# Patient Record
Sex: MALE | Race: WHITE | NOT HISPANIC OR LATINO | Employment: OTHER | URBAN - METROPOLITAN AREA
[De-identification: names, ages, dates, MRNs, and addresses within clinical notes are randomized per-mention and may not be internally consistent; named-entity substitution may affect disease eponyms.]

---

## 2017-01-10 ENCOUNTER — INITIAL CONSULT (OUTPATIENT)
Dept: OPHTHALMOLOGY | Facility: CLINIC | Age: 67
End: 2017-01-10
Payer: MEDICARE

## 2017-01-10 DIAGNOSIS — H53.021 REFRACTIVE AMBLYOPIA, RIGHT: ICD-10-CM

## 2017-01-10 DIAGNOSIS — H25.13 NUCLEAR SCLEROSIS, BILATERAL: Primary | ICD-10-CM

## 2017-01-10 DIAGNOSIS — H52.7 REFRACTIVE ERROR: ICD-10-CM

## 2017-01-10 DIAGNOSIS — E11.9 DIABETES MELLITUS TYPE 2 WITHOUT RETINOPATHY: ICD-10-CM

## 2017-01-10 PROCEDURE — 99213 OFFICE O/P EST LOW 20 MIN: CPT | Mod: PBBFAC,PO | Performed by: OPHTHALMOLOGY

## 2017-01-10 PROCEDURE — 99999 PR PBB SHADOW E&M-EST. PATIENT-LVL III: CPT | Mod: PBBFAC,,, | Performed by: OPHTHALMOLOGY

## 2017-01-10 PROCEDURE — 92014 COMPRE OPH EXAM EST PT 1/>: CPT | Mod: S$PBB,,, | Performed by: OPHTHALMOLOGY

## 2017-01-10 NOTE — LETTER
January 10, 2017      JOSEPH Lange, OD  [address] [facility] [address] [place] [address]           [place] [facility]  [address] [facility] [address] [place] [address]  Phone: [phone]  Fax: [phone]          Patient: Cyurs Eckert   MR Number: [id number]   YOB: 1950   Date of Visit: 1/10/2017       Dear Dr. JOSEPH Lange:    Thank you for referring Cyrus Eckert to me for evaluation. Attached you will find relevant portions of my assessment and plan of care.    If you have questions, please do not hesitate to call me. I look forward to following Cyrus Eckert along with you.    Sincerely,    Guera Lui MD    Enclosure  CC:  No Recipients    If you would like to receive this communication electronically, please contact [email or web address] or [phone] to request more information on [employer] Link access.    For providers and/or their staff who would like to refer a patient to [facility], please contact us through our one-stop-shop provider referral line, [facility], at [phone].    If you feel you have received this communication in error or would no longer like to receive these types of communications, please e-mail [email or web address]

## 2017-01-10 NOTE — PATIENT INSTRUCTIONS
What Are Cataracts?  A clear lens in the eye focuses light. This lets the eye see images sharply. With age, the lens slowly becomes cloudy. The cloudy lens is a cataract. A cataract scatters light and makes it hard for the eye to focus. Cataracts often form in both eyes. But one lens may cloud faster than the other.      The aging of your lens  Your lens may cloud so slowly that you don`t notice any vision changes at first. But as the cataract gets worse, the eye has a harder time focusing. In early stages, glasses may help you see better. As the lens gets more cloudy, your doctor may recommend surgery to restore your vision.  © 5802-9383 [employer]. [address] [place] [address]. All rights reserved. This information is not intended as a substitute for professional medical care. Always follow your healthcare professional's instructions.        Small-Incision Cataract Surgery: Removing the Old Lens  You may be surprised by how little time small-incision cataract surgery takes.  The procedure  Your doctor uses a microscope and tiny tools to make the incision and remove the old lens. A special tool breaks apart the old lens with sound waves (ultrasound) and then takes out the pieces. This process is called phacoemulsification. The natural membrane (capsule) that held your lens is left in place.  Incision size  A smaller incision (top) means a shorter recovery time for you. The location of the incision will vary.         © 6494-5492 [employer]. [address] [place] [address]. All rights reserved. This information is not intended as a substitute for professional medical care. Always follow your healthcare professional's instructions.        Small-Incision Cataract Surgery: Implanting the New Lens  Once your old lens has been removed, your doctor slips the new lens (IOL or intraocular lens) in through the incision. The IOL is then placed in the capsule that held your  old lens. With the new lens in place, your doctor is ready to close the incision. Some incisions may be closed with stitches. Others are self-sealing. That means they will stay closed on their own without stitches. The IOL does much the same thing as your old lens did before it became cloudy. It focuses light, letting you see sharp images and vivid colors. The IOL normally lasts a lifetime.  How small is an IOL?        Flexible tabs hold the IOL in place in the eye's natural capsule.     © 3626-5411 [employer]. [address] [place] [address]. All rights reserved. This information is not intended as a substitute for professional medical care. Always follow your healthcare professional's instructions.        Before Small-Incision Cataract Surgery  Like any operation, small-incision cataract surgery requires preparation.    Your health history  Your eye doctor will review your health history. Based on that, he or she may order tests or talk to your other health care providers. Tell your eye doctor which medicines you take. That includes over-the-counter medicine such as aspirin.  Your eye exam  You will have a complete eye exam. Your eye doctor or a technician will use devices that measure the length and curve of your eye. These measurements let your doctor select the proper new lens (IOL or intraocular lens) for you.  The night before surgery  Dont eat or drink anything after midnight the night before your surgery. This includes water, coffee, chewing gum, and mints. If you have been told to continue your daily medicine, take it only with small sips of water. Make sure you follow any other instructions your doctor gives you.  The day of surgery  Have someone you know drive you to and from the outpatient surgery clinic or hospital. Plan to be there for about 2 to 3 hours. When you arrive, youll sign a consent form if you havent done so already. This form explains the risks of surgery. Just  before surgery, your doctor will give you medicine that will relax you and keep you from feeling pain. You may sleep lightly.  Risks and complications  · Your doctor may have to shift from a small incision to a larger incision.  · There is a small chance of bleeding, infection, or swelling.   © 1398-9336 [employer]. [address] [place] [address], [place] [address]. All rights reserved. This information is not intended as a substitute for professional medical care. Always follow your healthcare professional's instructions.        After Small-Incision Cataract Surgery: The First 24 Hours  After surgery, youll rest in a recovery area for about an hour. Even though you may feel fine, you should take it easy. Your doctor will let you know what you should and shouldnt do once you get home. You may need to wear eye protection the first day. Also remember to take any eye drops or other medicine your doctor prescribes.    Back at home  Spend your first day relaxing at home. Watch TV, read, or talk to a friend. Be careful to:  · Not rub your eye  · Not lift anything that makes you strain  · Not drink alcohol within the first 24 hours  What to expect  Its normal for your eye to be bruised or bloodshot at first. You may also feel itching or mild discomfort. You may have some short-term (temporary) fluid discharge. These wont last long.   Getting back in action  You may be able to get back to much of your routine on the first day. But with some tasks, your doctor may ask you to wait. Always follow your doctor's recommendations.  Here are some general guidelines:  · You can shower again in 2 to 3 days.  · You can cook again in 2 to 3 days.  · You can drive again in 5 to 7 days.  · You can exercise again in 14 days.  When to call your doctor  Call your doctor if:  · Your pain is not relieved by over-the-counter medicine.  · You have nausea or vomiting.  · Your vision suddenly becomes worse.   © 5454-1314 [employer]. [address] [place] [address], [place] [address]. All rights reserved. This information is not intended as a substitute for professional medical care. Always follow your healthcare professional's instructions.

## 2017-01-10 NOTE — PROGRESS NOTES
HPI     Blurred Vision    Additional comments: blurred va x 2-3 months, night time is worse//    linson ref for cat eval//           Comments   blurred va x 2-3 months, night time is worse//  linson ref for cat eval//   no flashes or floaters noted by the pt//  Pt had stroke about 5 yrs ago//     Dm II last a1c was Hemoglobin A1C       Date                     Value               Ref Range             Status                01/29/2016               6.7 (H)             0.0 - 5.6 %           Final                   06/13/2011               9.1 (H)             4.0 - 6.2 %           Final                 04/20/2010               7.5 (H)             4.0 - 6.2 %           Final            ----------    Agree with above. Presents with wife, Gely. History of amblyopia OD. Did   not notice any vision change from stroke. Started noticing worsening of   vision OS 3-4 months ago.        Last edited by Guera Lui MD on 1/10/2017  2:26 PM.   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   (History)        Assessment /Plan     For exam results, see Encounter Report.    Nuclear sclerosis, bilateral    PSC (posterior subcapsular cataract), bilateral    Diabetes mellitus type 2 without retinopathy    Refractive amblyopia, right    Refractive error          Nuclear sclerosis, bilateral - visually significant OS when combined with PSC. Dilates well, denies flomax. Schedule CE OS, then re-evaluate OD at 1 month post-op. Warm compresses, lid hygiene. Handout  given.    PSC (posterior subcapsular cataract), bilateral - OS>>OD. See above.     Diabetes mellitus type 2 without retinopathy - will need Ketorolac 1 week prior    Refractive amblyopia, right - very high cyl, but corrects to 20/40. Discussed toric lens, but will need K topography to make sure it is regular.     Refractive error - see above. [employer] emmetropia, pt advised he will need readers for near.

## 2017-01-10 NOTE — MR AVS SNAPSHOT
[facility]  [address] [facility] [address] [facility] [address]  [place] [address]  Phone: [phone]  Fax: [phone]                  Cyrus Eckert   1/10/2017 1:00 PM   Initial consult    Description:  Male : 1950   Provider:  Guera Lui MD   Department:  [facility]           Reason for Visit     Blurred Vision           Diagnoses this Visit        Comments    Nuclear sclerosis, bilateral    -  Primary     PSC (posterior subcapsular cataract), bilateral         Diabetes mellitus type 2 without retinopathy         Refractive amblyopia, right         Refractive error                To Do List           Goals (5 Years of Data)     None      Follow-Up and Disposition     Return for Pre-op cataract surgery OS.      [facility] On Call     [facility] On Call Nurse Care Line -  Assistance  Registered nurses in the [facility] provide clinical advisement, health education, appointment booking, and other advisory services.  Call for this free service at [phone].             Medications           Message regarding Medications     Verify the changes and/or additions to your medication regime listed below are the same as discussed with your clinician today.  If any of these changes or additions are incorrect, please notify your healthcare provider.             Verify that the below list of medications is an accurate representation of the medications you are currently taking.  If none reported, the list may be blank. If incorrect, please contact your healthcare provider. Carry this list with you in case of emergency.           Current Medications     aspirin 325 MG tablet Take 325 mg by mouth once daily.      atorvastatin (LIPITOR) 40 MG tablet Take 1 tablet (40 mg total) by mouth once daily.    escitalopram oxalate (LEXAPRO) 20 MG tablet TAKE ONE TABLET BY MOUTH ONCE DAILY    lisinopril (PRINIVIL,ZESTRIL) 5 MG tablet TAKE ONE TABLET BY MOUTH ONCE DAILY    metformin (GLUCOPHAGE)  1000 MG tablet TAKE ONE TABLET BY MOUTH TWICE DAILY    omega-3 fatty acids (FISH OIL) 300 mg Cap Take by mouth.      omeprazole (PRILOSEC OTC) 20 MG tablet No Sig Provided    pantoprazole (PROTONIX) 40 MG tablet Take 40 mg by mouth once daily.      TRADJENTA 5 mg Tab tablet TAKE ONE TABLET BY MOUTH ONCE DAILY    vitamin D 185 MG Tab Take 185 mg by mouth once daily.             Clinical Reference Information           Allergies as of 1/10/2017     No Known Drug Allergies      Immunizations Administered on Date of Encounter - 1/10/2017     None      Instructions      What Are Cataracts?  A clear lens in the eye focuses light. This lets the eye see images sharply. With age, the lens slowly becomes cloudy. The cloudy lens is a cataract. A cataract scatters light and makes it hard for the eye to focus. Cataracts often form in both eyes. But one lens may cloud faster than the other.      The aging of your lens  Your lens may cloud so slowly that you don`t notice any vision changes at first. But as the cataract gets worse, the eye has a harder time focusing. In early stages, glasses may help you see better. As the lens gets more cloudy, your doctor may recommend surgery to restore your vision.  © 3218-8787 [employer]. [address] [place] [address], [place] [address]. All rights reserved. This information is not intended as a substitute for professional medical care. Always follow your healthcare professional's instructions.        Small-Incision Cataract Surgery: Removing the Old Lens  You may be surprised by how little time small-incision cataract surgery takes.  The procedure  Your doctor uses a microscope and tiny tools to make the incision and remove the old lens. A special tool breaks apart the old lens with sound waves (ultrasound) and then takes out the pieces. This process is called phacoemulsification. The natural membrane (capsule) that held your lens is left in place.  Incision size  A smaller incision  (top) means a shorter recovery time for you. The location of the incision will vary.         © 5852-7977 [employer]. [address] [place] [address]. All rights reserved. This information is not intended as a substitute for professional medical care. Always follow your healthcare professional's instructions.        Small-Incision Cataract Surgery: Implanting the New Lens  Once your old lens has been removed, your doctor slips the new lens (IOL or intraocular lens) in through the incision. The IOL is then placed in the capsule that held your old lens. With the new lens in place, your doctor is ready to close the incision. Some incisions may be closed with stitches. Others are self-sealing. That means they will stay closed on their own without stitches. The IOL does much the same thing as your old lens did before it became cloudy. It focuses light, letting you see sharp images and vivid colors. The IOL normally lasts a lifetime.  How small is an IOL?        Flexible tabs hold the IOL in place in the eye's natural capsule.     © 2326-8174 [employer]. [address] [place] [address]. All rights reserved. This information is not intended as a substitute for professional medical care. Always follow your healthcare professional's instructions.        Before Small-Incision Cataract Surgery  Like any operation, small-incision cataract surgery requires preparation.    Your health history  Your eye doctor will review your health history. Based on that, he or she may order tests or talk to your other health care providers. Tell your eye doctor which medicines you take. That includes over-the-counter medicine such as aspirin.  Your eye exam  You will have a complete eye exam. Your eye doctor or a technician will use devices that measure the length and curve of your eye. These measurements let your doctor select the proper new lens (IOL or intraocular lens) for you.  The night  before surgery  Dont eat or drink anything after midnight the night before your surgery. This includes water, coffee, chewing gum, and mints. If you have been told to continue your daily medicine, take it only with small sips of water. Make sure you follow any other instructions your doctor gives you.  The day of surgery  Have someone you know drive you to and from the outpatient surgery clinic or hospital. Plan to be there for about 2 to 3 hours. When you arrive, youll sign a consent form if you havent done so already. This form explains the risks of surgery. Just before surgery, your doctor will give you medicine that will relax you and keep you from feeling pain. You may sleep lightly.  Risks and complications  · Your doctor may have to shift from a small incision to a larger incision.  · There is a small chance of bleeding, infection, or swelling.   © 3688-8215 [employer]. [address] [place] [address]. All rights reserved. This information is not intended as a substitute for professional medical care. Always follow your healthcare professional's instructions.        After Small-Incision Cataract Surgery: The First 24 Hours  After surgery, youll rest in a recovery area for about an hour. Even though you may feel fine, you should take it easy. Your doctor will let you know what you should and shouldnt do once you get home. You may need to wear eye protection the first day. Also remember to take any eye drops or other medicine your doctor prescribes.    Back at home  Spend your first day relaxing at home. Watch TV, read, or talk to a friend. Be careful to:  · Not rub your eye  · Not lift anything that makes you strain  · Not drink alcohol within the first 24 hours  What to expect  Its normal for your eye to be bruised or bloodshot at first. You may also feel itching or mild discomfort. You may have some short-term (temporary) fluid discharge. These wont last long.   Getting back  in action  You may be able to get back to much of your routine on the first day. But with some tasks, your doctor may ask you to wait. Always follow your doctor's recommendations.  Here are some general guidelines:  · You can shower again in 2 to 3 days.  · You can cook again in 2 to 3 days.  · You can drive again in 5 to 7 days.  · You can exercise again in 14 days.  When to call your doctor  Call your doctor if:  · Your pain is not relieved by over-the-counter medicine.  · You have nausea or vomiting.  · Your vision suddenly becomes worse.   © 3319-7478 [employer] [address] [place] [address], [place] [address]. All rights reserved. This information is not intended as a substitute for professional medical care. Always follow your healthcare professional's instructions.

## 2017-01-26 ENCOUNTER — TELEPHONE (OUTPATIENT)
Dept: OPHTHALMOLOGY | Facility: CLINIC | Age: 67
End: 2017-01-26

## 2017-01-26 NOTE — TELEPHONE ENCOUNTER
----- Message from Ashlee Hussein sent at 1/26/2017  2:30 PM CST -----  Contact: Mansi  Patient's wife is calling to have appointment for tomorrow 1/27/17 rescheduled as have to go out of town. Please call [phone]. Thanks!

## 2017-02-24 ENCOUNTER — OFFICE VISIT (OUTPATIENT)
Dept: OPHTHALMOLOGY | Facility: CLINIC | Age: 67
End: 2017-02-24
Payer: MEDICARE

## 2017-02-24 DIAGNOSIS — H52.7 REFRACTIVE ERROR: ICD-10-CM

## 2017-02-24 DIAGNOSIS — H25.13 NUCLEAR SCLEROSIS, BILATERAL: Primary | ICD-10-CM

## 2017-02-24 DIAGNOSIS — H53.021 REFRACTIVE AMBLYOPIA, RIGHT: ICD-10-CM

## 2017-02-24 DIAGNOSIS — E11.9 DIABETES MELLITUS TYPE 2 WITHOUT RETINOPATHY: ICD-10-CM

## 2017-02-24 PROCEDURE — 92136 OPHTHALMIC BIOMETRY: CPT | Mod: PBBFAC,PO,LT | Performed by: OPHTHALMOLOGY

## 2017-02-24 PROCEDURE — 99999 PR PBB SHADOW E&M-EST. PATIENT-LVL III: CPT | Mod: PBBFAC,,, | Performed by: OPHTHALMOLOGY

## 2017-02-24 PROCEDURE — 92012 INTRM OPH EXAM EST PATIENT: CPT | Mod: S$PBB,,, | Performed by: OPHTHALMOLOGY

## 2017-02-24 PROCEDURE — 99213 OFFICE O/P EST LOW 20 MIN: CPT | Mod: PBBFAC,PO | Performed by: OPHTHALMOLOGY

## 2017-02-24 RX ORDER — MOXIFLOXACIN 5 MG/ML
1 SOLUTION/ DROPS OPHTHALMIC 4 TIMES DAILY
Qty: 3 ML | Refills: 0 | Status: SHIPPED | OUTPATIENT
Start: 2017-03-07 | End: 2017-04-11 | Stop reason: ALTCHOICE

## 2017-02-24 RX ORDER — TROPICAMIDE 10 MG/ML
1 SOLUTION/ DROPS OPHTHALMIC
Status: CANCELLED | OUTPATIENT
Start: 2017-02-24

## 2017-02-24 RX ORDER — PREDNISOLONE ACETATE 10 MG/ML
1 SUSPENSION/ DROPS OPHTHALMIC 4 TIMES DAILY
Qty: 5 ML | Refills: 2 | Status: SHIPPED | OUTPATIENT
Start: 2017-03-08 | End: 2017-04-11 | Stop reason: ALTCHOICE

## 2017-02-24 RX ORDER — PHENYLEPHRINE HYDROCHLORIDE 100 MG/ML
1 SOLUTION/ DROPS OPHTHALMIC ONCE
Status: CANCELLED | OUTPATIENT
Start: 2017-02-24 | End: 2017-02-24

## 2017-02-24 RX ORDER — MOXIFLOXACIN 5 MG/ML
1 SOLUTION/ DROPS OPHTHALMIC
Status: CANCELLED | OUTPATIENT
Start: 2017-02-24 | End: 2017-02-24

## 2017-02-24 RX ORDER — KETOROLAC TROMETHAMINE 5 MG/ML
1 SOLUTION OPHTHALMIC 4 TIMES DAILY
Qty: 5 ML | Refills: 2 | Status: SHIPPED | OUTPATIENT
Start: 2017-03-01 | End: 2017-04-11 | Stop reason: ALTCHOICE

## 2017-02-24 RX ORDER — PHENYLEPHRINE HYDROCHLORIDE 25 MG/ML
1 SOLUTION/ DROPS OPHTHALMIC
Status: CANCELLED | OUTPATIENT
Start: 2017-02-24

## 2017-02-24 RX ORDER — LIDOCAINE HYDROCHLORIDE 40 MG/ML
1 INJECTION, SOLUTION RETROBULBAR
Status: CANCELLED | OUTPATIENT
Start: 2017-02-24 | End: 2017-02-24

## 2017-02-24 RX ORDER — PROPARACAINE HYDROCHLORIDE 5 MG/ML
1 SOLUTION/ DROPS OPHTHALMIC
Status: CANCELLED | OUTPATIENT
Start: 2017-02-24 | End: 2017-02-24

## 2017-02-24 RX ORDER — LIDOCAINE HYDROCHLORIDE 40 MG/ML
1 INJECTION, SOLUTION RETROBULBAR
Status: CANCELLED | OUTPATIENT
Start: 2017-02-24

## 2017-02-24 RX ORDER — CYCLOPENTOLATE HYDROCHLORIDE 10 MG/ML
1 SOLUTION/ DROPS OPHTHALMIC
Status: CANCELLED | OUTPATIENT
Start: 2017-02-24

## 2017-02-24 RX ORDER — PROPARACAINE HYDROCHLORIDE 5 MG/ML
1 SOLUTION/ DROPS OPHTHALMIC
Status: CANCELLED | OUTPATIENT
Start: 2017-02-24

## 2017-02-24 NOTE — MR AVS SNAPSHOT
[place] - Ophthalmology  [address]  [facility] [address]  Phone: [phone]  Fax: [phone]                  Cyrus Eckert   2017 1:30 PM   Office Visit    Description:  Male : 1950   Provider:  Guera Lui MD   Department:  [place] - Ophthalmology           Reason for Visit     Pre-op Exam           Diagnoses this Visit        Comments    Nuclear sclerosis, bilateral    -  Primary     PSC (posterior subcapsular cataract), bilateral         Diabetes mellitus type 2 without retinopathy         Refractive amblyopia, right         Refractive error                To Do List           Future Appointments        Provider Department Dept Phone    3/9/2017 8:00 AM Guera Lui MD [facility] Ophthalmology [phone]    3/14/2017 3:00 PM Guera Lui MD [facility] Ophthalmology [phone]    2017 1:15 PM Guera Lui MD [facility] Ophthalmology [phone]      Goals (5 Years of Data)     None       These Medications        Disp Refills Start End    moxifloxacin (VIGAMOX) 0.5 % ophthalmic solution 3 mL 0 3/7/2017     Place 1 drop into the left eye 4 (four) times daily. Start 1 day before cataract surgery. - Left Eye    Pharmacy: [address]  Ph #: [phone]       prednisoLONE acetate (PRED FORTE) 1 % DrpS 5 mL 2 3/8/2017     Place 1 drop into the left eye 4 (four) times daily. Start on day of surgery. - Left Eye    Pharmacy: [address]  Ph #: [phone]       ketorolac 0.5% (ACULAR) 0.5 % Drop 5 mL 2 3/1/2017     Place 1 drop into the left eye 4 (four) times daily. Start 1 week before surgery. - Left Eye    Pharmacy: [address]  Ph #: [phone]         [facility]     [facility] -  Assistance  Registered nurses in the  [facility] provide clinical advisement, health education, appointment booking, and other advisory services.  Call for this free service at [phone].             Medications           Message regarding Medications     Verify the changes and/or additions to your medication regime listed below are the same as discussed with your clinician today.  If any of these changes or additions are incorrect, please notify your healthcare provider.        START taking these NEW medications        Refills    moxifloxacin (VIGAMOX) 0.5 % ophthalmic solution 0    Starting on: 3/7/2017    Sig: Place 1 drop into the left eye 4 (four) times daily. Start 1 day before cataract surgery.    Class: Normal    Route: Left Eye    prednisoLONE acetate (PRED FORTE) 1 % DrpS 2    Starting on: 3/8/2017    Sig: Place 1 drop into the left eye 4 (four) times daily. Start on day of surgery.    Class: Normal    Route: Left Eye    ketorolac 0.5% (ACULAR) 0.5 % Drop 2    Starting on: 3/1/2017    Sig: Place 1 drop into the left eye 4 (four) times daily. Start 1 week before surgery.    Class: Normal    Route: Left Eye           Verify that the below list of medications is an accurate representation of the medications you are currently taking.  If none reported, the list may be blank. If incorrect, please contact your healthcare provider. Carry this list with you in case of emergency.           Current Medications     aspirin 325 MG tablet Take 325 mg by mouth once daily.      atorvastatin (LIPITOR) 40 MG tablet Take 1 tablet (40 mg total) by mouth once daily.    escitalopram oxalate (LEXAPRO) 20 MG tablet TAKE ONE TABLET BY MOUTH ONCE DAILY    lisinopril (PRINIVIL,ZESTRIL) 5 MG tablet TAKE ONE TABLET BY MOUTH ONCE DAILY    metformin (GLUCOPHAGE) 1000 MG tablet TAKE ONE TABLET BY MOUTH TWICE DAILY    omega-3 fatty acids (FISH OIL) 300 mg Cap Take by mouth.      omeprazole (PRILOSEC OTC) 20 MG tablet No Sig Provided    pantoprazole  (PROTONIX) 40 MG tablet Take 40 mg by mouth once daily.      TRADJENTA 5 mg Tab tablet TAKE ONE TABLET BY MOUTH ONCE DAILY    vitamin D 185 MG Tab Take 185 mg by mouth once daily.      ketorolac 0.5% (ACULAR) 0.5 % Drop Starting on Mar 01, 2017. Place 1 drop into the left eye 4 (four) times daily. Start 1 week before surgery.    moxifloxacin (VIGAMOX) 0.5 % ophthalmic solution Starting on Mar 07, 2017. Place 1 drop into the left eye 4 (four) times daily. Start 1 day before cataract surgery.    prednisoLONE acetate (PRED FORTE) 1 % DrpS Starting on Mar 08, 2017. Place 1 drop into the left eye 4 (four) times daily. Start on day of surgery.           Clinical Reference Information           Allergies as of 2/24/2017     No Known Drug Allergies      Immunizations Administered on Date of Encounter - 2/24/2017     None      Orders Placed During Today's Visit      Normal Orders This Visit    Case Request Operating Room: phacoemulsification of cataract with intraocular lens implant left eye     IOL Master - OS - Left Eye     Future Labs/Procedures Expected by Expires    Computerized corneal topography  As directed 2/24/2018      Instructions      What Are Cataracts?  A clear lens in the eye focuses light. This lets the eye see images sharply. With age, the lens slowly becomes cloudy. The cloudy lens is a cataract. A cataract scatters light and makes it hard for the eye to focus. Cataracts often form in both eyes. But one lens may cloud faster than the other.      The aging of your lens  Your lens may cloud so slowly that you don`t notice any vision changes at first. But as the cataract gets worse, the eye has a harder time focusing. In early stages, glasses may help you see better. As the lens gets more cloudy, your doctor may recommend surgery to restore your vision.  Date Last Reviewed: 6/14/2015  © 1157-9877 [employer]. [address] [place] [address], [place] [address]. All rights reserved. This information is not  intended as a substitute for professional medical care. Always follow your healthcare professional's instructions.        Small-Incision Cataract Surgery: Removing the Old Lens  You may be surprised by how little time small-incision cataract surgery takes.  The procedure  Your doctor uses a microscope and tiny tools to make the incision and remove the old lens. A special tool breaks apart the old lens with sound waves (ultrasound) and then takes out the pieces. This process is called phacoemulsification. The natural membrane (capsule) that held your lens is left in place.  Incision size  A smaller incision (top) means a shorter recovery time for you. The location of the incision will vary.         Date Last Reviewed: 6/14/2015  © 9542-2256 [employer]. [address] [place] [address]. All rights reserved. This information is not intended as a substitute for professional medical care. Always follow your healthcare professional's instructions.        Small-Incision Cataract Surgery: Implanting the New Lens  Once your old lens has been removed, your doctor slips the new lens (IOL or intraocular lens) in through the incision. The IOL is then placed in the capsule that held your old lens. With the new lens in place, your doctor is ready to close the incision. Some incisions may be closed with stitches. Others are self-sealing. That means they will stay closed on their own without stitches. The IOL does much the same thing as your old lens did before it became cloudy. It focuses light, letting you see sharp images and vivid colors. The IOL normally lasts a lifetime.  How small is an IOL?        Flexible tabs hold the IOL in place in the eye's natural capsule.     Date Last Reviewed: 6/14/2015  © 8119-6610 [employer]. [address] [place] [address]. All rights reserved. This information is not intended as a substitute for professional medical care. Always follow your  healthcare professional's instructions.        Before Small-Incision Cataract Surgery    Like any operation, small-incision cataract surgery requires preparation.  Your health history  Your eye doctor will review your health history. Based on that, he or she may order tests or talk to your other health care providers. Tell your eye doctor which medicines you take. That includes over-the-counter medicine such as aspirin.  Your eye exam  You will have a complete eye exam. Your eye doctor or a technician will use devices that measure the length and curve of your eye. These measurements let your doctor select the proper new lens (IOL or intraocular lens) for you.  The night before surgery  Dont eat or drink anything after midnight the night before your surgery. This includes water, coffee, chewing gum, and mints. If you have been told to continue your daily medicine, take it only with small sips of water. Make sure you follow any other instructions your doctor gives you.  The day of surgery  Have someone you know drive you to and from the outpatient surgery clinic or hospital. Plan to be there for about 2 to 3 hours. When you arrive, youll sign a consent form if you havent done so already. This form explains the risks of surgery. Just before surgery, your doctor will give you medicine that will relax you and keep you from feeling pain. You may sleep lightly.  Risks and complications  · Your doctor may have to shift from a small incision to a larger incision.  · There is a small chance of bleeding, infection, or swelling.   Date Last Reviewed: 6/14/2015  © 9720-1056 [employer]. [address] [place] [address], [place] [address]. All rights reserved. This information is not intended as a substitute for professional medical care. Always follow your healthcare professional's instructions.        After Small-Incision Cataract Surgery: The First 24 Hours    After surgery, youll rest in a recovery area for about an  hour. Even though you may feel fine, you should take it easy. Your doctor will let you know what you should and shouldnt do once you get home. You may need to wear eye protection the first day. Also remember to take any eye drops or other medicine your doctor prescribes.  Back at home  Spend your first day relaxing at home. Watch TV, read, or talk to a friend. Be careful to:  · Not rub your eye  · Not lift anything that makes you strain  · Not drink alcohol within the first 24 hours  What to expect  Its normal for your eye to be bruised or bloodshot at first. You may also feel itching or mild discomfort. You may have some short-term (temporary) fluid discharge. These wont last long.   Getting back in action  You may be able to get back to much of your routine on the first day. But with some tasks, your doctor may ask you to wait. Always follow your doctor's recommendations.  Here are some general guidelines:  · You can shower again in 2 to 3 days.  · You can cook again in 2 to 3 days.  · You can drive again in 5 to 7 days.  · You can exercise again in 14 days.  When to call your doctor  Call your doctor if:  · Your pain is not relieved by over-the-counter medicine.  · You have nausea or vomiting.  · Your vision suddenly becomes worse.   Date Last Reviewed: 6/14/2015 © 2000-2016 [employer]. [address] [place] [address]. All rights reserved. This information is not intended as a substitute for professional medical care. Always follow your healthcare professional's instructions.             Language Assistance Services     ATTENTION: Language assistance services are available, free of charge. Please call [phone].      ATENCIÓN: Si eliudla judie, tiene a allen disposición servicios gratuitos de asistencia lingüística. Llame al [phone].     KAMALA Ý: N?u b?n nói Ti?ng Vi?t, có các d?ch v? h? tr? ngôn ng? mi?n phí dành cho b?n. G?i s? [phone].         [facility]  Ophthalmology complies with applicable Federal civil rights laws and does not discriminate on the basis of race, color, national origin, age, disability, or sex.

## 2017-02-24 NOTE — PATIENT INSTRUCTIONS
What Are Cataracts?  A clear lens in the eye focuses light. This lets the eye see images sharply. With age, the lens slowly becomes cloudy. The cloudy lens is a cataract. A cataract scatters light and makes it hard for the eye to focus. Cataracts often form in both eyes. But one lens may cloud faster than the other.      The aging of your lens  Your lens may cloud so slowly that you don`t notice any vision changes at first. But as the cataract gets worse, the eye has a harder time focusing. In early stages, glasses may help you see better. As the lens gets more cloudy, your doctor may recommend surgery to restore your vision.  Date Last Reviewed: 6/14/2015  © 3074-3886 [employer]. [address] [place] [address]. All rights reserved. This information is not intended as a substitute for professional medical care. Always follow your healthcare professional's instructions.        Small-Incision Cataract Surgery: Removing the Old Lens  You may be surprised by how little time small-incision cataract surgery takes.  The procedure  Your doctor uses a microscope and tiny tools to make the incision and remove the old lens. A special tool breaks apart the old lens with sound waves (ultrasound) and then takes out the pieces. This process is called phacoemulsification. The natural membrane (capsule) that held your lens is left in place.  Incision size  A smaller incision (top) means a shorter recovery time for you. The location of the incision will vary.         Date Last Reviewed: 6/14/2015  © 6657-5692 [employer]. [address] [place] [address]. All rights reserved. This information is not intended as a substitute for professional medical care. Always follow your healthcare professional's instructions.        Small-Incision Cataract Surgery: Implanting the New Lens  Once your old lens has been removed, your doctor slips the new lens (IOL or intraocular lens) in through the  incision. The IOL is then placed in the capsule that held your old lens. With the new lens in place, your doctor is ready to close the incision. Some incisions may be closed with stitches. Others are self-sealing. That means they will stay closed on their own without stitches. The IOL does much the same thing as your old lens did before it became cloudy. It focuses light, letting you see sharp images and vivid colors. The IOL normally lasts a lifetime.  How small is an IOL?        Flexible tabs hold the IOL in place in the eye's natural capsule.     Date Last Reviewed: 6/14/2015  © 8492-5095 [employer]. [address] [place] [address], [place] [address]. All rights reserved. This information is not intended as a substitute for professional medical care. Always follow your healthcare professional's instructions.        Before Small-Incision Cataract Surgery    Like any operation, small-incision cataract surgery requires preparation.  Your health history  Your eye doctor will review your health history. Based on that, he or she may order tests or talk to your other health care providers. Tell your eye doctor which medicines you take. That includes over-the-counter medicine such as aspirin.  Your eye exam  You will have a complete eye exam. Your eye doctor or a technician will use devices that measure the length and curve of your eye. These measurements let your doctor select the proper new lens (IOL or intraocular lens) for you.  The night before surgery  Dont eat or drink anything after midnight the night before your surgery. This includes water, coffee, chewing gum, and mints. If you have been told to continue your daily medicine, take it only with small sips of water. Make sure you follow any other instructions your doctor gives you.  The day of surgery  Have someone you know drive you to and from the outpatient surgery clinic or hospital. Plan to be there for about 2 to 3 hours. When you arrive, youll sign  a consent form if you havent done so already. This form explains the risks of surgery. Just before surgery, your doctor will give you medicine that will relax you and keep you from feeling pain. You may sleep lightly.  Risks and complications  · Your doctor may have to shift from a small incision to a larger incision.  · There is a small chance of bleeding, infection, or swelling.   Date Last Reviewed: 6/14/2015 © 2000-2016 "[employer]. [address] [place] [address], [place] [address]. All rights reserved. This information is not intended as a substitute for professional medical care. Always follow your healthcare professional's instructions.        After Small-Incision Cataract Surgery: The First 24 Hours    After surgery, youll rest in a recovery area for about an hour. Even though you may feel fine, you should take it easy. Your doctor will let you know what you should and shouldnt do once you get home. You may need to wear eye protection the first day. Also remember to take any eye drops or other medicine your doctor prescribes.  Back at home  Spend your first day relaxing at home. Watch TV, read, or talk to a friend. Be careful to:  · Not rub your eye  · Not lift anything that makes you strain  · Not drink alcohol within the first 24 hours  What to expect  Its normal for your eye to be bruised or bloodshot at first. You may also feel itching or mild discomfort. You may have some short-term (temporary) fluid discharge. These wont last long.   Getting back in action  You may be able to get back to much of your routine on the first day. But with some tasks, your doctor may ask you to wait. Always follow your doctor's recommendations.  Here are some general guidelines:  · You can shower again in 2 to 3 days.  · You can cook again in 2 to 3 days.  · You can drive again in 5 to 7 days.  · You can exercise again in 14 days.  When to call your doctor  Call your doctor if:  · Your pain is not relieved by  over-the-counter medicine.  · You have nausea or vomiting.  · Your vision suddenly becomes worse.   Date Last Reviewed: 6/14/2015  © 1584-4122 [employer]. [address] [place] [address], [place] [address]. All rights reserved. This information is not intended as a substitute for professional medical care. Always follow your healthcare professional's instructions.

## 2017-02-24 NOTE — PROGRESS NOTES
HPI     Pre-op Exam    Additional comments: phAco iol OS to be d 3/8 in Cov// NO toric//           Comments   phAco iol OS to be d 3/8 in Cov// NO toric//       Last edited by Carol Tomlinson on 2/24/2017  2:18 PM. 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   (History)        Assessment /Plan     For exam results, see Encounter Report.    Nuclear sclerosis, bilateral  -     Place in Outpatient; Standing  -     Vital signs; Standing  -     Diet NPO; Standing  -     Case Request Operating Room: phacoemulsification of cataract with intraocular lens implant left eye  -     IOL Master - OS - Left Eye  -     Computerized corneal topography; Future    PSC (posterior subcapsular cataract), bilateral  -     Place in Outpatient; Standing  -     Vital signs; Standing  -     Diet NPO; Standing  -     Case Request Operating Room: phacoemulsification of cataract with intraocular lens implant left eye  -     IOL Master - OS - Left Eye  -     Computerized corneal topography; Future    Diabetes mellitus type 2 without retinopathy    Refractive amblyopia, right  -     Computerized corneal topography; Future    Refractive error  -     Computerized corneal topography; Future    Other orders  -     proparacaine 0.5 % ophthalmic solution 1 drop; Place 1 drop into the left eye On call Procedure for Other (Surgery).  -     tropicamide 1% ophthalmic solution 1 drop; Place 1 drop into the left eye On call Procedure (Surgery).  -     phenylephrine HCL 2.5% ophthalmic  solution 1 drop; Place 1 drop into the left eye On call Procedure for Irritation (Surgery).  -     cyclopentolate 1% ophthalmic solution 1 drop; Place 1 drop into the left eye On call Procedure for Other (Surgery).  -     phenylephrine HCL 10% ophthalmic solution 1 drop; Place 1 drop into the left eye once.  -     moxifloxacin 0.5 % ophthalmic solution 1 drop; Place 1 drop into the left eye every 5 (five) minutes.  -     proparacaine 0.5 % ophthalmic solution 1 drop; Place 1 drop into the left eye every 5 (five) minutes.  -     lidocaine (PF) 40 mg/mL (4 %) injection 4 mg; 0.1 mLs (4 mg total) by Other route every 5 (five) minutes.  -     lidocaine (PF) 40 mg/mL (4 %) injection 4 mg; 0.1 mLs (4 mg total) by Other route as needed (eye pain).  -     moxifloxacin (VIGAMOX) 0.5 % ophthalmic solution; Place 1 drop into the left eye 4 (four) times daily. Start 1 day before cataract surgery.  Dispense: 3 mL; Refill: 0  -     prednisoLONE acetate (PRED FORTE) 1 % DrpS; Place 1 drop into the left eye 4 (four) times daily. Start on day of surgery.  Dispense: 5 mL; Refill: 2  -     ketorolac 0.5% (ACULAR) 0.5 % Drop; Place 1 drop into the left eye 4 (four) times daily. Start 1 week before surgery.  Dispense: 5 mL; Refill: 2            Nuclear sclerosis, bilateral - visually significant OS when combined with PSC. Dilates well, denies flomax. Pre-op done today for CE OS, then re-evaluate OD at 1 month post-op. Warm compresses, lid hygiene.     PSC (posterior subcapsular cataract), bilateral - OS>>OD. See above.     Diabetes mellitus type 2 without retinopathy - will need Ketorolac 1 week prior    Refractive amblyopia, right - very high cyl, but corrects to 20/40. Discussed toric lens, K topography done to confirm it is regular.     Refractive error - see above. [employer] emmetropia, pt advised he will need readers for near.

## 2017-03-07 ENCOUNTER — ANESTHESIA EVENT (OUTPATIENT)
Dept: SURGERY | Facility: HOSPITAL | Age: 67
End: 2017-03-07
Payer: MEDICARE

## 2017-03-07 RX ORDER — MULTIVIT WITH MINERALS/HERBS
1 TABLET ORAL DAILY
Status: ON HOLD | COMMUNITY
End: 2023-09-06

## 2017-03-07 RX ORDER — ASPIRIN 81 MG/1
81 TABLET ORAL DAILY
COMMUNITY

## 2017-03-07 RX ORDER — MULTIVITAMIN
1 TABLET ORAL DAILY
COMMUNITY

## 2017-03-07 RX ORDER — METOPROLOL SUCCINATE 25 MG/1
25 TABLET, EXTENDED RELEASE ORAL DAILY
COMMUNITY
End: 2023-09-05 | Stop reason: SDUPTHER

## 2017-03-07 RX ORDER — METFORMIN HYDROCHLORIDE 500 MG/1
500 TABLET ORAL DAILY
COMMUNITY
End: 2023-09-05 | Stop reason: DRUGHIGH

## 2017-03-07 RX ORDER — CLOPIDOGREL BISULFATE 75 MG/1
75 TABLET ORAL DAILY
COMMUNITY

## 2017-03-08 ENCOUNTER — HOSPITAL ENCOUNTER (OUTPATIENT)
Facility: HOSPITAL | Age: 67
Discharge: HOME | End: 2017-03-08
Attending: OPHTHALMOLOGY | Admitting: OPHTHALMOLOGY
Payer: MEDICARE

## 2017-03-08 ENCOUNTER — ANESTHESIA (OUTPATIENT)
Dept: SURGERY | Facility: HOSPITAL | Age: 67
End: 2017-03-08
Payer: MEDICARE

## 2017-03-08 VITALS
RESPIRATION RATE: 16 BRPM | SYSTOLIC BLOOD PRESSURE: 135 MMHG | HEIGHT: 72 IN | OXYGEN SATURATION: 98 % | WEIGHT: 215 LBS | DIASTOLIC BLOOD PRESSURE: 79 MMHG | BODY MASS INDEX: 29.12 KG/M2 | HEART RATE: 56 BPM | TEMPERATURE: 98 F

## 2017-03-08 DIAGNOSIS — H25.13 NUCLEAR SCLEROSIS, BILATERAL: ICD-10-CM

## 2017-03-08 PROBLEM — H25.10 NUCLEAR SCLEROSIS: Status: ACTIVE | Noted: 2017-03-08

## 2017-03-08 LAB — GLUCOSE SERPL-MCNC: 130 MG/DL (ref 70–110)

## 2017-03-08 PROCEDURE — 36000706: Mod: PO | Performed by: OPHTHALMOLOGY

## 2017-03-08 PROCEDURE — D9220A PRA ANESTHESIA: Mod: ANES,,, | Performed by: ANESTHESIOLOGY

## 2017-03-08 PROCEDURE — 25000003 PHARM REV CODE 250: Mod: PO | Performed by: NURSE ANESTHETIST, CERTIFIED REGISTERED

## 2017-03-08 PROCEDURE — 37000009 HC ANESTHESIA EA ADD 15 MINS: Mod: PO | Performed by: OPHTHALMOLOGY

## 2017-03-08 PROCEDURE — 37000008 HC ANESTHESIA 1ST 15 MINUTES: Mod: PO | Performed by: OPHTHALMOLOGY

## 2017-03-08 PROCEDURE — 25000003 PHARM REV CODE 250: Mod: PO | Performed by: ANESTHESIOLOGY

## 2017-03-08 PROCEDURE — 63600175 PHARM REV CODE 636 W HCPCS: Mod: PO | Performed by: NURSE ANESTHETIST, CERTIFIED REGISTERED

## 2017-03-08 PROCEDURE — 71000033 HC RECOVERY, INTIAL HOUR: Mod: PO | Performed by: OPHTHALMOLOGY

## 2017-03-08 PROCEDURE — 36000707: Mod: PO | Performed by: OPHTHALMOLOGY

## 2017-03-08 PROCEDURE — 63600175 PHARM REV CODE 636 W HCPCS: Mod: PO | Performed by: OPHTHALMOLOGY

## 2017-03-08 PROCEDURE — V2632 POST CHMBR INTRAOCULAR LENS: HCPCS | Mod: PO | Performed by: OPHTHALMOLOGY

## 2017-03-08 PROCEDURE — 25000003 PHARM REV CODE 250: Mod: PO | Performed by: OPHTHALMOLOGY

## 2017-03-08 PROCEDURE — 66984 XCAPSL CTRC RMVL W/O ECP: CPT | Mod: LT,,, | Performed by: OPHTHALMOLOGY

## 2017-03-08 PROCEDURE — 82962 GLUCOSE BLOOD TEST: CPT | Mod: PO | Performed by: OPHTHALMOLOGY

## 2017-03-08 PROCEDURE — D9220A PRA ANESTHESIA: Mod: CRNA,,, | Performed by: NURSE ANESTHETIST, CERTIFIED REGISTERED

## 2017-03-08 DEVICE — LENS 17.0: Type: IMPLANTABLE DEVICE | Site: EYE | Status: FUNCTIONAL

## 2017-03-08 RX ORDER — MOXIFLOXACIN 5 MG/ML
SOLUTION/ DROPS OPHTHALMIC
Status: DISCONTINUED | OUTPATIENT
Start: 2017-03-08 | End: 2017-03-08 | Stop reason: HOSPADM

## 2017-03-08 RX ORDER — PHENYLEPHRINE HYDROCHLORIDE 100 MG/ML
1 SOLUTION/ DROPS OPHTHALMIC ONCE
Status: DISCONTINUED | OUTPATIENT
Start: 2017-03-08 | End: 2017-03-08 | Stop reason: HOSPADM

## 2017-03-08 RX ORDER — ACETAMINOPHEN 325 MG/1
650 TABLET ORAL EVERY 6 HOURS PRN
Status: CANCELLED | OUTPATIENT
Start: 2017-03-08

## 2017-03-08 RX ORDER — MIDAZOLAM HYDROCHLORIDE 1 MG/ML
INJECTION, SOLUTION INTRAMUSCULAR; INTRAVENOUS
Status: DISCONTINUED | OUTPATIENT
Start: 2017-03-08 | End: 2017-03-08

## 2017-03-08 RX ORDER — TROPICAMIDE 10 MG/ML
1 SOLUTION/ DROPS OPHTHALMIC
Status: DISCONTINUED | OUTPATIENT
Start: 2017-03-08 | End: 2017-03-08 | Stop reason: HOSPADM

## 2017-03-08 RX ORDER — LIDOCAINE HYDROCHLORIDE 10 MG/ML
INJECTION, SOLUTION EPIDURAL; INFILTRATION; INTRACAUDAL; PERINEURAL
Status: DISCONTINUED | OUTPATIENT
Start: 2017-03-08 | End: 2017-03-08 | Stop reason: HOSPADM

## 2017-03-08 RX ORDER — PROPARACAINE HYDROCHLORIDE 5 MG/ML
1 SOLUTION/ DROPS OPHTHALMIC
Status: DISCONTINUED | OUTPATIENT
Start: 2017-03-08 | End: 2017-03-08 | Stop reason: HOSPADM

## 2017-03-08 RX ORDER — LIDOCAINE HYDROCHLORIDE 10 MG/ML
1 INJECTION, SOLUTION EPIDURAL; INFILTRATION; INTRACAUDAL; PERINEURAL ONCE
Status: COMPLETED | OUTPATIENT
Start: 2017-03-08 | End: 2017-03-08

## 2017-03-08 RX ORDER — CYCLOPENTOLATE HYDROCHLORIDE 10 MG/ML
1 SOLUTION/ DROPS OPHTHALMIC
Status: DISCONTINUED | OUTPATIENT
Start: 2017-03-08 | End: 2017-03-08 | Stop reason: HOSPADM

## 2017-03-08 RX ORDER — SODIUM CHLORIDE, SODIUM LACTATE, POTASSIUM CHLORIDE, CALCIUM CHLORIDE 600; 310; 30; 20 MG/100ML; MG/100ML; MG/100ML; MG/100ML
INJECTION, SOLUTION INTRAVENOUS CONTINUOUS
Status: DISCONTINUED | OUTPATIENT
Start: 2017-03-08 | End: 2017-03-08 | Stop reason: HOSPADM

## 2017-03-08 RX ORDER — LIDOCAINE HCL/PF 100 MG/5ML
SYRINGE (ML) INTRAVENOUS
Status: DISCONTINUED | OUTPATIENT
Start: 2017-03-08 | End: 2017-03-08

## 2017-03-08 RX ORDER — SODIUM CHLORIDE 0.9 % (FLUSH) 0.9 %
3 SYRINGE (ML) INJECTION
Status: DISCONTINUED | OUTPATIENT
Start: 2017-03-08 | End: 2017-03-08 | Stop reason: HOSPADM

## 2017-03-08 RX ORDER — EPINEPHRINE 1 MG/ML
INJECTION INTRAMUSCULAR; INTRAVENOUS; SUBCUTANEOUS
Status: DISCONTINUED | OUTPATIENT
Start: 2017-03-08 | End: 2017-03-08 | Stop reason: HOSPADM

## 2017-03-08 RX ORDER — PHENYLEPHRINE HYDROCHLORIDE 25 MG/ML
1 SOLUTION/ DROPS OPHTHALMIC
Status: DISCONTINUED | OUTPATIENT
Start: 2017-03-08 | End: 2017-03-08 | Stop reason: HOSPADM

## 2017-03-08 RX ORDER — MOXIFLOXACIN 5 MG/ML
1 SOLUTION/ DROPS OPHTHALMIC
Status: COMPLETED | OUTPATIENT
Start: 2017-03-08 | End: 2017-03-08

## 2017-03-08 RX ORDER — ONDANSETRON 2 MG/ML
INJECTION INTRAMUSCULAR; INTRAVENOUS
Status: DISCONTINUED | OUTPATIENT
Start: 2017-03-08 | End: 2017-03-08

## 2017-03-08 RX ORDER — LIDOCAINE HYDROCHLORIDE 40 MG/ML
1 INJECTION, SOLUTION RETROBULBAR
Status: ACTIVE | OUTPATIENT
Start: 2017-03-08 | End: 2017-03-08

## 2017-03-08 RX ORDER — FENTANYL CITRATE 50 UG/ML
INJECTION, SOLUTION INTRAMUSCULAR; INTRAVENOUS
Status: DISCONTINUED | OUTPATIENT
Start: 2017-03-08 | End: 2017-03-08

## 2017-03-08 RX ORDER — PROPARACAINE HYDROCHLORIDE 5 MG/ML
1 SOLUTION/ DROPS OPHTHALMIC
Status: COMPLETED | OUTPATIENT
Start: 2017-03-08 | End: 2017-03-08

## 2017-03-08 RX ADMIN — MOXIFLOXACIN HYDROCHLORIDE 1 DROP: 5 SOLUTION/ DROPS OPHTHALMIC at 07:03

## 2017-03-08 RX ADMIN — SODIUM CHLORIDE, SODIUM LACTATE, POTASSIUM CHLORIDE, AND CALCIUM CHLORIDE: 600; 310; 30; 20 INJECTION, SOLUTION INTRAVENOUS at 07:03

## 2017-03-08 RX ADMIN — PHENYLEPHRINE HYDROCHLORIDE 1 DROP: 25 SOLUTION/ DROPS OPHTHALMIC at 07:03

## 2017-03-08 RX ADMIN — ONDANSETRON 4 MG: 2 INJECTION, SOLUTION INTRAMUSCULAR; INTRAVENOUS at 09:03

## 2017-03-08 RX ADMIN — LIDOCAINE HYDROCHLORIDE 5 MG: 10 INJECTION, SOLUTION EPIDURAL; INFILTRATION; INTRACAUDAL; PERINEURAL at 07:03

## 2017-03-08 RX ADMIN — TROPICAMIDE 1 DROP: 10 SOLUTION/ DROPS OPHTHALMIC at 07:03

## 2017-03-08 RX ADMIN — MIDAZOLAM HYDROCHLORIDE 1 MG: 1 INJECTION, SOLUTION INTRAMUSCULAR; INTRAVENOUS at 09:03

## 2017-03-08 RX ADMIN — FENTANYL CITRATE 25 MCG: 50 INJECTION, SOLUTION INTRAMUSCULAR; INTRAVENOUS at 09:03

## 2017-03-08 RX ADMIN — CYCLOPENTOLATE HYDROCHLORIDE 1 DROP: 10 SOLUTION/ DROPS OPHTHALMIC at 07:03

## 2017-03-08 RX ADMIN — LIDOCAINE HYDROCHLORIDE: 40 INJECTION, SOLUTION RETROBULBAR; TOPICAL at 08:03

## 2017-03-08 RX ADMIN — PROPARACAINE HYDROCHLORIDE 1 DROP: 5 SOLUTION/ DROPS OPHTHALMIC at 07:03

## 2017-03-08 RX ADMIN — MIDAZOLAM HYDROCHLORIDE 0.5 MG: 1 INJECTION, SOLUTION INTRAMUSCULAR; INTRAVENOUS at 09:03

## 2017-03-08 RX ADMIN — LIDOCAINE HYDROCHLORIDE 50 SYRINGE: 20 INJECTION PARENTERAL at 09:03

## 2017-03-08 NOTE — TRANSFER OF CARE
Anesthesia Transfer of Care Note    Patient: Cyrus Eckert    Procedure(s) Performed: Procedure(s) (LRB):  phacoemulsification of cataract with intraocular lens implant left eye (Left)    Patient location: PACU    Anesthesia Type: MAC    Transport from OR: Transported from OR on room air with adequate spontaneous ventilation    Post pain: adequate analgesia    Post assessment: no apparent anesthetic complications and tolerated procedure well    Post vital signs: stable    Level of consciousness: awake    Nausea/Vomiting: no nausea/vomiting    Complications: none          Last vitals:   Visit Vitals    /74 (BP Location: Right arm, Patient Position: Lying, BP Method: Automatic)    Pulse 60    Temp 36.6 °C (97.9 °F) (Skin)    Resp 16    Ht 6' (1.829 m)    Wt 97.5 kg (215 lb)    SpO2 97%    BMI 29.16 kg/m2

## 2017-03-08 NOTE — IP AVS SNAPSHOT
[facility]  [address]  [place] [address]  Phone: [phone]           Patient Discharge Instructions     Our goal is to set you up for success. This packet includes information on your condition, medications, and your home care. It will help you to care for yourself so you don't get sicker and need to go back to the hospital.     Please ask your nurse if you have any questions.        There are many details to remember when preparing to leave the hospital. Here is what you will need to do:    1. Take your medicine. If you are prescribed medications, review your Medication List in the following pages. You may have new medications to  at the pharmacy and others that you'll need to stop taking. Review the instructions for how and when to take your medications. Talk with your doctor or nurses if you are unsure of what to do.     2. Go to your follow-up appointments. Specific follow-up information is listed in the following pages. Your may be contacted by a transition nurse or clinical provider about future appointments. Be sure we have all of the phone numbers to reach you, if needed. Please contact your provider's office if you are unable to make an appointment.     3. Watch for warning signs. Your doctor or nurse will give you detailed warning signs to watch for and when to call for assistance. These instructions may also include educational information about your condition. If you experience any of warning signs to your health, call your doctor.               [facility]  Unless otherwise directed by your provider, please contact [facility], our nurse care line that is available for 24/7 assistance.     [phone] (toll-free)    Registered nurses in the [facility] provide clinical advisement, health education, appointment booking, and other advisory services.                    ** Verify the list of medication(s) below is accurate and up  to date. Carry this with you in case of emergency. If your medications have changed, please notify your healthcare provider.             Medication List      ASK your doctor about these medications        Additional Info                      aspirin 81 MG EC tablet   Commonly known as:  ECOTRIN   Refills:  0   Dose:  81 mg    Instructions:  Take 81 mg by mouth once daily.     Begin Date    AM    Noon    PM    Bedtime       atorvastatin 40 MG tablet   Commonly known as:  LIPITOR   Quantity:  30 tablet   Refills:  5   Dose:  40 mg   Indications:  mixed hyperlipidemia    Instructions:  Take 1 tablet (40 mg total) by mouth once daily.     Begin Date    AM    Noon    PM    Bedtime       b complex vitamins tablet   Refills:  0   Dose:  1 tablet    Instructions:  Take 1 tablet by mouth once daily.     Begin Date    AM    Noon    PM    Bedtime       BIOTIN ORAL   Refills:  0    Instructions:  Take by mouth.     Begin Date    AM    Noon    PM    Bedtime       clopidogrel 75 mg tablet   Commonly known as:  PLAVIX   Refills:  0   Dose:  75 mg    Instructions:  Take 75 mg by mouth once daily.     Begin Date    AM    Noon    PM    Bedtime       COENZYME Q10 ORAL   Refills:  0    Instructions:  Take by mouth.     Begin Date    AM    Noon    PM    Bedtime       * escitalopram oxalate 20 MG tablet   Commonly known as:  LEXAPRO   Quantity:  30 tablet   Refills:  0    Instructions:  TAKE ONE TABLET BY MOUTH ONCE DAILY     Begin Date    AM    Noon    PM    Bedtime       * escitalopram oxalate 20 MG tablet   Commonly known as:  LEXAPRO   Quantity:  30 tablet   Refills:  0    Instructions:  TAKE ONE TABLET BY MOUTH ONCE DAILY     Begin Date    AM    Noon    PM    Bedtime       ketorolac 0.5% 0.5 % Drop   Commonly known as:  ACULAR   Quantity:  5 mL   Refills:  2   Dose:  1 drop    Instructions:  Place 1 drop into the left eye 4 (four) times daily. Start 1 week before surgery.     Begin Date    AM    Noon    PM    Bedtime        lisinopril 5 MG tablet   Commonly known as:  PRINIVIL,ZESTRIL   Quantity:  90 tablet   Refills:  0    Instructions:  TAKE ONE TABLET BY MOUTH ONCE DAILY     Begin Date    AM    Noon    PM    Bedtime       metformin 500 MG tablet   Commonly known as:  GLUCOPHAGE   Refills:  0   Dose:  500 mg    Instructions:  Take 500 mg by mouth once daily.     Begin Date    AM    Noon    PM    Bedtime       metoprolol succinate 25 MG 24 hr tablet   Commonly known as:  TOPROL-XL   Refills:  0   Dose:  25 mg    Instructions:  Take 25 mg by mouth once daily.     Begin Date    AM    Noon    PM    Bedtime       moxifloxacin 0.5 % ophthalmic solution   Commonly known as:  VIGAMOX   Quantity:  3 mL   Refills:  0   Dose:  1 drop    Last time this was given:  1 drop on 3/8/2017  8:38 AM   Instructions:  Place 1 drop into the left eye 4 (four) times daily. Start 1 day before cataract surgery.     Begin Date    AM    Noon    PM    Bedtime       ONE DAILY MULTIVITAMIN per tablet   Refills:  0   Dose:  1 tablet   Generic drug:  multivitamin    Instructions:  Take 1 tablet by mouth once daily.     Begin Date    AM    Noon    PM    Bedtime       prednisoLONE acetate 1 % Drps   Commonly known as:  PRED FORTE   Quantity:  5 mL   Refills:  2   Dose:  1 drop    Instructions:  Place 1 drop into the left eye 4 (four) times daily. Start on day of surgery.     Begin Date    AM    Noon    PM    Bedtime       PRILOSEC OTC 20 MG tablet   Refills:  0   Generic drug:  omeprazole    Instructions:  No Sig Provided     Begin Date    AM    Noon    PM    Bedtime       TRADJENTA 5 mg Tab tablet   Quantity:  30 tablet   Refills:  0   Generic drug:  linagliptin    Instructions:  TAKE ONE TABLET BY MOUTH ONCE DAILY     Begin Date    AM    Noon    PM    Bedtime       vitamin D 1000 units Tab   Refills:  0   Dose:  185 mg    Instructions:  Take 185 mg by mouth once daily.     Begin Date    AM    Noon    PM    Bedtime       * Notice:  This list has 2 medication(s) that  are the same as other medications prescribed for you. Read the directions carefully, and ask your doctor or other care provider to review them with you.               Please bring to all follow up appointments:    1. A copy of your discharge instructions.  2. All medicines you are currently taking in their original bottles.  3. Identification and insurance card.    Please arrive 15 minutes ahead of scheduled appointment time.    Please call 24 hours in advance if you must reschedule your appointment and/or time.        Your Scheduled Appointments     Mar 09, 2017  8:00 AM CST   Post OP with Guera Lui MD   [facility]    [address] [facility] [facility] [address]   [phone]            Mar 14, 2017  3:00 PM CDT   Post OP with Guera Lui MD   [facility]    [address]  [facility] [address]   [phone]            Apr 11, 2017  1:15 PM CDT   Post OP with Guera Lui MD   [facility]    [address]  [facility] [address]   [phone]            Aug 10, 2017  2:00 PM CDT   Established Patient Visit with Chang Martinez MD   [facility] ([facility])    [address], [address]  [facility] [id number] [phone]              Follow-up Information     Follow up In 1 day.    Why:  for post op          Discharge Instructions         Procedural Sedation (Adult)  You have been given medicine by vein to make you sleep during your surgery. This may have included both a pain medicine and sleeping medicine. Most of the effects have worn off. But you may still have some drowsiness for the next 6 to 8 hours.  Home care  Follow these guidelines when you get home:  · For the next 8 hours, you should be watched by a responsible adult. This person should make sure your condition is not getting worse.  · Don't take any medicine by mouth for pain or for  sleep during the next 4 hours. These might react with the medicines you were given in the hospital. This could cause a much stronger response than usual.  · Don't drink any alcohol for the next 24 hours.  · Don't drive, operate dangerous machinery, or make important business or personal decisions during the next 24 hours.  Follow-up care  Follow up with your healthcare provider if you are not alert and back to your usual level of activity within 12 hours.  When to seek medical advice  Call your healthcare provider right away if any of these occur:  · Drowsiness gets worse  · Weakness or dizziness gets worse  · Repeated vomiting  · You cannot be awakened   Date Last Reviewed: 10/18/2016  © 7544-1013 [employer]. [address] [place] [address], [place] [address]. All rights reserved. This information is not intended as a substitute for professional medical care. Always follow your healthcare professional's instructions.      See eye sheet      Primary Diagnosis     Your primary diagnosis was:  Hardening Of Central Portion Of Lens Of Eye      Admission Information     Date & Time Provider Department CSN    3/8/2017  6:50 AM Guera Lui MD [facility] [id number]      Care Providers     Provider Role Specialty Primary office phone    Guera Lui MD Attending Provider Ophthalmology [phone]    Guera Lui MD Surgeon  Ophthalmology [phone]      Your Vitals Were     BP Pulse Temp Resp Height Weight    135/79 (BP Location: Left arm, Patient Position: Lying, BP Method: Automatic) 56 97.9 °F (36.6 °C) (Skin) 16 6' (1.829 m) 97.5 kg (215 lb)    SpO2 BMI             98% 29.16 kg/m2         Recent Lab Values        7/23/2007 8/8/2008 1/26/2009 6/15/2009 8/5/2009 4/20/2010 6/13/2011 1/29/2016      8:29 AM  7:33 AM  9:45 AM 11:07 AM  7:54 AM  7:20 AM  3:45 PM 10:37 AM    A1C 7.3 (H) 7.7 (H) 8.7 (H) 6.7 (H) 6.7 (H) 7.5 (H) 9.1 (H) 6.7 (H)    Comment for A1C at  10:37 AM on 1/29/2016:  Reference Interval:  5.0 - 5.6 Normal   5.7 - 6.4 High Risk   > 6.5 Diabetic     Hgb A1c results are standardized based on the (NGSP) National   Glycohemoglobin Standardization Program.    Hemoglobin A1C levels are related to mean serum/plasma glucose   during the preceding 2-3 months.            Allergies as of 3/8/2017        Reactions    No Known Drug Allergies       Advance Directives     An advance directive is a document which, in the event you are no longer able to make decisions for yourself, tells your healthcare team what kind of treatment you do or do not want to receive, or who you would like to make those decisions for you.  If you do not currently have an advance directive, [facility] encourages you to create one.  For more information call:  [phone]), [phone] ([phone]),  or log on to [email or web address].        Language Assistance Services     ATTENTION: Language assistance services are available, free of charge. Please call [phone].      ATENCIÓN: Si habla español, tiene a allen disposición servicios gratuitos de asistencia lingüística. Llame al [phone].     [facility] Ý: N?u b?n nói Ti?ng Vi?t, có các d?ch v? h? tr? ngôn ng? mi?n phí dành cho b?n. G?i s? [phone].         [facility] complies with applicable Federal civil rights laws and does not discriminate on the basis of race, color, national origin, age, disability, or sex.

## 2017-03-08 NOTE — ANESTHESIA POSTPROCEDURE EVALUATION
Anesthesia Post Evaluation    Patient: Cyrus Eckert    Procedure(s) Performed: Procedure(s) (LRB):  phacoemulsification of cataract with intraocular lens implant left eye (Left)    Final Anesthesia Type: MAC  Patient location during evaluation: PACU  Patient participation: Yes- Able to Participate  Level of consciousness: awake and alert and oriented  Post-procedure vital signs: reviewed and stable  Pain management: adequate  Airway patency: patent  PONV status at discharge: No PONV  Anesthetic complications: no      Cardiovascular status: blood pressure returned to baseline and hemodynamically stable  Respiratory status: unassisted, spontaneous ventilation and room air  Hydration status: euvolemic  Follow-up not needed.        Visit Vitals    /79 (BP Location: Left arm, Patient Position: Lying, BP Method: Automatic)    Pulse (!) 56    Temp 36.6 °C (97.9 °F) (Skin)    Resp 16    Ht 6' (1.829 m)    Wt 97.5 kg (215 lb)    SpO2 98%    BMI 29.16 kg/m2       Pain/Anish Score: Pain Assessment Performed: Yes (3/8/2017  9:57 AM)  Presence of Pain: denies (3/8/2017  9:57 AM)  Anish Score: 10 (3/8/2017  9:57 AM)

## 2017-03-08 NOTE — DISCHARGE INSTRUCTIONS
Procedural Sedation (Adult)  You have been given medicine by vein to make you sleep during your surgery. This may have included both a pain medicine and sleeping medicine. Most of the effects have worn off. But you may still have some drowsiness for the next 6 to 8 hours.  Home care  Follow these guidelines when you get home:  · For the next 8 hours, you should be watched by a responsible adult. This person should make sure your condition is not getting worse.  · Don't take any medicine by mouth for pain or for sleep during the next 4 hours. These might react with the medicines you were given in the hospital. This could cause a much stronger response than usual.  · Don't drink any alcohol for the next 24 hours.  · Don't drive, operate dangerous machinery, or make important business or personal decisions during the next 24 hours.  Follow-up care  Follow up with your healthcare provider if you are not alert and back to your usual level of activity within 12 hours.  When to seek medical advice  Call your healthcare provider right away if any of these occur:  · Drowsiness gets worse  · Weakness or dizziness gets worse  · Repeated vomiting  · You cannot be awakened   Date Last Reviewed: 10/18/2016  © 0369-9129 [employer] [address] [place] [address], [place] [address]. All rights reserved. This information is not intended as a substitute for professional medical care. Always follow your healthcare professional's instructions.      See eye sheet

## 2017-03-08 NOTE — H&P (VIEW-ONLY)
Subjective:       Patient ID: Cyrus Eckert is a 66 y.o. male.    Chief Complaint: Pre-op Exam (cataract surgery, left eye )    HPI Comments: For preoperative evaluation for cataract surgery and follow up for ASHD, cerebrovascular disease, diabetes, hypertension and hyperlipidemia. He is scheduled for cataract surgery on the left eye at [facility] on 3/8 with Dr Lui. He has been doing pretty well. He has had a recent fall about 2 weeks ago and bruised his right side. He has not been checking his blood sugars at home, but he says he is taking his medications. He has not had blood work done in a year. He has not had any recent chest pain, worsening of his residual neurological deficit, shortness of breath or syncope.  He saw his last about 3 months ago. He needs blood work for the diabetes and hyyperlidemia. No other problems or complaints.     Review of Systems   Constitutional: Negative for appetite change, chills, diaphoresis, fatigue and fever.   HENT: Negative for congestion, ear pain, hearing loss, sinus pressure, sore throat, trouble swallowing and voice change.    Eyes: Positive for visual disturbance. Negative for photophobia and pain.   Respiratory: Negative for cough, chest tightness and shortness of breath.    Cardiovascular: Negative for chest pain, palpitations and leg swelling.   Gastrointestinal: Negative for abdominal distention, abdominal pain, constipation, diarrhea, nausea and vomiting.   Endocrine: Negative for cold intolerance and heat intolerance.   Genitourinary: Negative for difficulty urinating, dysuria, frequency and hematuria.   Musculoskeletal: Negative for arthralgias and back pain.   Skin: Negative for rash.   Allergic/Immunologic: Negative for environmental allergies.   Neurological: Positive for facial asymmetry, speech difficulty and weakness. Negative for dizziness, seizures and headaches.   Psychiatric/Behavioral: Negative for agitation, confusion, hallucinations and  sleep disturbance.       Objective:      Vitals:    02/09/17 1345   BP: 118/80   Pulse: 60   Resp: 16   Weight: 97.5 kg (215 lb)     Body mass index is 30.85 kg/(m^2).  Physical Exam   Constitutional: He is oriented to person, place, and time. He appears well-developed and well-nourished. No distress.   HENT:   Head: Normocephalic and atraumatic.   Eyes: EOM are normal. Pupils are equal, round, and reactive to light. Right eye exhibits no discharge. Left eye exhibits no discharge.   Neck: Normal range of motion. Neck supple. No JVD present. No tracheal deviation present. No thyromegaly present.   Cardiovascular: Normal rate, regular rhythm, normal heart sounds and intact distal pulses.  Exam reveals no gallop and no friction rub.    No murmur heard.  Pulmonary/Chest: Effort normal and breath sounds normal. No respiratory distress. He has no wheezes. He has no rales. He exhibits no tenderness.   Abdominal: Bowel sounds are normal. He exhibits no distension and no mass. There is no tenderness.   Musculoskeletal: Normal range of motion. He exhibits no edema or tenderness.   Lymphadenopathy:     He has no cervical adenopathy.   Neurological: He is alert and oriented to person, place, and time. He displays normal reflexes. No cranial nerve deficit. Coordination normal.   Very minimal residual weakness on the right side. Moderate residual aphasia.    Skin: Skin is dry. No rash noted. He is not diaphoretic. No erythema.   Psychiatric: He has a normal mood and affect. His behavior is normal. Judgment and thought content normal.   Nursing note and vitals reviewed.      Assessment:       1. Cortical age-related cataract of left eye    2. Preoperative examination    3. Coronary artery disease involving native coronary artery of native heart without angina pectoris    4. CVD (cerebrovascular disease)    5. S/P coronary artery stent placement    6. Type 2 diabetes mellitus with peripheral vascular disease    7. Essential  hypertension    8. Combined fat and carbohydrate induced hyperlipemia    9. Screening for prostate cancer        Plan:       Cortical age-related cataract of left eye    Preoperative examination    Coronary artery disease involving native coronary artery of native heart without angina pectoris  -     CBC auto differential; Future; Expected date: 2/9/17    CVD (cerebrovascular disease)    S/P coronary artery stent placement    Type 2 diabetes mellitus with peripheral vascular disease  -     Comprehensive metabolic panel; Future; Expected date: 2/9/17  -     Hemoglobin A1c; Future; Expected date: 2/9/17  -     Microalbumin/creatinine urine ratio; Future; Expected date: 2/9/17    Essential hypertension    Combined fat and carbohydrate induced hyperlipemia  -     Lipid panel; Future; Expected date: 2/9/17  -     TSH; Future; Expected date: 2/9/17    Screening for prostate cancer  -     PSA, Screening; Future; Expected date: 2/9/17       Return in about 6 months (around 8/9/2017).    Cleared for cataract extraction and lens implant left eye under local/MAC anesthesia. Continue present medications and start monitoring blood sugar at home again.

## 2017-03-08 NOTE — OP NOTE
Date of surgery: 3/8/2017    Operative Report    Preoperative Diagnosis: Nuclear sclerosis, left eye    Postoperative Diagnosis: Nuclear sclerosis, left eye    Procedure: Phacoemulsification with posterior chamber intraocular lens, left eye    Surgeon: Guera Lui M.D.    Anesthesia: MAC with topical    Procedure in detail:   Informed consent was obtained. Indications, risks and alternatives to the procedure were explained to the patient. The patient was given the opportunity to ask questions and consented to the procedure in writing. In the preoperative holding area, the patients identity and operative site were confirmed.  Topical anesthetic was administered, consisting of alternating 0.5% Proparacaine and 4% preservative-free Lidocaine Q 5 minutes times 3.  The patient was taken to the operative suite.  A time-out was performed.  The left eye was prepped with 5% Betadine and draped in sterile fashion.  A lid speculum was placed.  A paracentesis was made at the 4 oclock position. 1% preservative-free lidocaine was placed in the anterior chamber, followed by Viscoat.  A bi-planar clear corneal incision was made at the 2 oclock position.  The cystotome was used to begin the continuous curvilinear capsulorrhexis, which was completed with the Utrata forceps.  BSS on a blunt-tipped cannula was used to hydrodissect and hydrodelineate the lens nucleus until it was noted to rotate freely.  Phacoemulsification was used in a divide-and-conquer technique to remove the lens nucleus, followed by irrigation and aspiration of the lens cortex.  The capsular bag was inflated with Healon.  The lens, which was an Max Acrysoft IQ IOL, model SN60WF, power 17.0, was placed in the capsular bag and rotated into position.  The remaining viscoelastic material was removed by I&A.  The wound and paracentesis were hydrated.  The lens was centered.  The anterior chamber was deep.  The eye pressure was good.  The wounds were checked  and watertight.  The lid speculum and drape were removed.  A drop of Vigamox was placed in the eye.  A clear shield was taped in place over the eye.    Mr. Eckert seemed to have a great deal of difficulty keeping his eye focused on the light of the microscope. It was constantly rolled up under the upper eyelid. Improved somewhat with IV fentanyl. Due to this, the capsulorrhexis diameter was too small superiorly, had to be enlarged with the cystotome. After phaco, which again was a challenge due to the eye position, a rent was noted in the anterior capsule. The pupil also came down. The rent did not appear to extend past the equator, so the lens was placed in the capsule with the haptics perpendicular to the rent. The patient did well, but might do better with a block and also the use of Omidria for the other eye.     TOTAL ULTRASOUND TIME:  1:20.5    PERCENTAGE OF TIME IN POSITION 3: 15.7 %    Estimated blood loss:  none    Specimens:   none  Complications:  none    Disposition:   stable to PACU

## 2017-03-08 NOTE — ANESTHESIA PREPROCEDURE EVALUATION
03/08/2017  Cyrus Eckert is a 66 y.o., male.    [facility] Anesthesia Evaluation      I have reviewed the Medications.     Review of Systems  Anesthesia Hx:  No problems with previous Anesthesia    Social:  Non-Smoker, No Alcohol Use    Hematology/Oncology:        Hematology Comments: Plavix and asa tx   Cardiovascular:   Hypertension CAD  CABG/stent (stent 2015)   Denies Angina. hyperlipidemia    Pulmonary:  Pulmonary Normal    Renal/:  Renal/ Normal     Hepatic/GI:  Hepatic/GI Normal    Neurological:   CVA (2012 - right hemiparesis), residual symptoms    Endocrine:   Diabetes        Physical Exam  General:  Well nourished    Airway/Jaw/Neck:  Airway Findings: Mouth Opening: Normal General Airway Assessment: Adult  Mallampati: III  Jaw/Neck Findings:  Neck ROM: Extension Decreased, Mild       Chest/Lungs:  Chest/Lungs Findings: Clear to auscultation, Normal Respiratory Rate     Heart/Vascular:  Heart Findings: Rate: Normal  Rhythm: Regular Rhythm  Sounds: Normal  Heart murmur: negative Vascular Findings: Normal (No carotid bruits.)       Mental Status:  Mental Status Findings:  Cooperative, Alert and Oriented         Anesthesia Plan  Type of Anesthesia, risks & benefits discussed:  Anesthesia Type:  MAC  Patient's Preference:   Intra-op Monitoring Plan:   Intra-op Monitoring Plan Comments:   Post Op Pain Control Plan:   Post Op Pain Control Plan Comments:   Induction:    Beta Blocker:  Patient is on a Beta-Blocker and has received one dose within the past 24 hours (No further documentation required).       Informed Consent: Patient understands risks and agrees with Anesthesia plan.  Questions answered. Anesthesia consent signed with patient.  ASA Score: 3     Day of Surgery Review of History & Physical:            Ready For Surgery From Anesthesia Perspective.

## 2017-03-09 ENCOUNTER — OFFICE VISIT (OUTPATIENT)
Dept: OPHTHALMOLOGY | Facility: CLINIC | Age: 67
End: 2017-03-09
Payer: MEDICARE

## 2017-03-09 DIAGNOSIS — H25.13 NUCLEAR SCLEROSIS, BILATERAL: ICD-10-CM

## 2017-03-09 DIAGNOSIS — H52.7 REFRACTIVE ERROR: ICD-10-CM

## 2017-03-09 DIAGNOSIS — E11.9 DIABETES MELLITUS TYPE 2 WITHOUT RETINOPATHY: ICD-10-CM

## 2017-03-09 DIAGNOSIS — Z98.890 POST-OPERATIVE STATE: Primary | ICD-10-CM

## 2017-03-09 DIAGNOSIS — H53.021 REFRACTIVE AMBLYOPIA, RIGHT: ICD-10-CM

## 2017-03-09 PROCEDURE — 99024 POSTOP FOLLOW-UP VISIT: CPT | Mod: ,,, | Performed by: OPHTHALMOLOGY

## 2017-03-09 PROCEDURE — 99213 OFFICE O/P EST LOW 20 MIN: CPT | Mod: PBBFAC,PO | Performed by: OPHTHALMOLOGY

## 2017-03-09 PROCEDURE — 99999 PR PBB SHADOW E&M-EST. PATIENT-LVL III: CPT | Mod: PBBFAC,,, | Performed by: OPHTHALMOLOGY

## 2017-03-09 NOTE — PATIENT INSTRUCTIONS
Continue drops 4x per day (Vigamox, Prednisolone, Ketorolac).    Call MD immediately if signs of infection occur (pain, redness, blurred vision). Do not wait for next appointment.    Call MD immediately if you experience new floaters/shadows in your vision, flashes of light, or a curtain or veil appearing to in your vision.    Cell number [phone].

## 2017-03-09 NOTE — PROGRESS NOTES
HPI     Post-op Evaluation    Additional comments: 1 day s/p phaco iol OS 3/8           Comments   Pt states no pain. Some irritation with sun light. Seeing well    Gtts: Ketorolac, Prednisolone, Vigamox QID OS       Last edited by Cheryle Quintana on 3/9/2017  7:59 AM. (History)            Assessment /Plan     For exam results, see Encounter Report.    Post-operative state    Nuclear sclerosis, bilateral    PSC (posterior subcapsular cataract), bilateral    Diabetes mellitus type 2 without retinopathy    Refractive amblyopia, right    Refractive error              Nuclear sclerosis, bilateral - POD #1 s/p Phaco/PCIOL OS. Doing well. Continue drops QID. Precautions reviewed. RTC 1 week  visually significant OS when combined with PSC. Dilates well, denies flomax, however pupil did come down during surgery OS - recommend Omidria for OD when it comes time. Also rolled eyes up a lot - may benefit from john-bulbar block. Re-evaluate PSC OD undilated at 1 month post-op. D/c Warm compresses, lid hygiene.     PSC (posterior subcapsular cataract), bilateral - OS>>OD. See above.     Diabetes mellitus type 2 without retinopathy - will need Ketorolac 1 week prior    Refractive amblyopia, right - very high cyl, but corrects to 20/40. Discussed toric lens, K topography done to confirm it is regular.     Refractive error - see above. [employer] [facility], pt advised he will need readers for near.

## 2017-03-09 NOTE — DISCHARGE SUMMARY
Operative Note     SUMMARY     Surgery Date: 3/8/2017     Surgeon(s) and Role:     * Guera Lui MD - Primary    Pre-op Diagnosis:  PSC (posterior subcapsular cataract), bilateral [H26.8]  Nuclear sclerosis, bilateral [H25.13]    Post-op Diagnosis:  PSC (posterior subcapsular cataract), bilateral [H26.8]  Nuclear sclerosis, bilateral [H25.13]    Procedure(s) (LRB):  phacoemulsification of cataract with intraocular lens implant left eye (Left)    Anesthesia: Local MAC    Findings/Key Components:  Same as post op diagnosis    Estimated Blood Loss: * No values recorded between 3/8/2017  9:23 AM and 3/8/2017  9:57 AM *         Specimens     None            Discharge Note      SUMMARY     Admit Date: 3/8/2017    Attending Physician: No att. providers found     Discharge Physician: No att. providers found    Discharge Date: 3/9/2017     Final Diagnosis: PSC (posterior subcapsular cataract), bilateral [H26.8]  Nuclear sclerosis, bilateral [H25.13]    Hospital Course: The patient was admitted for outpatient surgery and tolerated the procedure well. The patient was discharged home in stable condition the same day.    Diet: Advance as tolerated    Follow-Up: Follow up with Dr. Lui, next day, as previously scheduled  Activity as tolerated.      Activity: Per Handout Instructions    Disposition: Home or self care    Patient Instructions:   Discharge Medication List as of 3/8/2017 10:16 AM      CONTINUE these medications which have NOT CHANGED    Details   aspirin (ECOTRIN) 81 MG EC tablet Take 81 mg by mouth once daily., Until Discontinued, Historical Med      atorvastatin (LIPITOR) 40 MG tablet Take 1 tablet (40 mg total) by mouth once daily., Starting 2/1/2016, Until Discontinued, Normal      b complex vitamins tablet Take 1 tablet by mouth once daily., Until Discontinued, Historical Med      BIOTIN ORAL Take by mouth., Until Discontinued, Historical Med      clopidogrel (PLAVIX) 75 mg tablet Take 75 mg by  mouth once daily., Until Discontinued, Historical Med      !! escitalopram oxalate (LEXAPRO) 20 MG tablet TAKE ONE TABLET BY MOUTH ONCE DAILY, Normal      !! escitalopram oxalate (LEXAPRO) 20 MG tablet TAKE ONE TABLET BY MOUTH ONCE DAILY, Normal      ketorolac 0.5% (ACULAR) 0.5 % Drop Place 1 drop into the left eye 4 (four) times daily. Start 1 week before surgery., Starting 3/1/2017, Until Discontinued, Normal      lisinopril (PRINIVIL,ZESTRIL) 5 MG tablet TAKE ONE TABLET BY MOUTH ONCE DAILY, Normal      metformin (GLUCOPHAGE) 500 MG tablet Take 500 mg by mouth once daily., Until Discontinued, Historical Med      metoprolol succinate (TOPROL-XL) 25 MG 24 hr tablet Take 25 mg by mouth once daily., Until Discontinued, Historical Med      moxifloxacin (VIGAMOX) 0.5 % ophthalmic solution Place 1 drop into the left eye 4 (four) times daily. Start 1 day before cataract surgery., Starting 3/7/2017, Until Discontinued, Normal      multivitamin (ONE DAILY MULTIVITAMIN) per tablet Take 1 tablet by mouth once daily., Until Discontinued, Historical Med      omeprazole (PRILOSEC OTC) 20 MG tablet No Sig Provided, Starting 7/3/2011, Until Discontinued, Historical Med      TRADJENTA 5 mg Tab tablet TAKE ONE TABLET BY MOUTH ONCE DAILY, Normal      UBIDECARENONE (COENZYME Q10 ORAL) Take by mouth., Until Discontinued, Historical Med      vitamin D 185 MG Tab Take 185 mg by mouth once daily.  , Until Discontinued, Historical Med      prednisoLONE acetate (PRED FORTE) 1 % DrpS Place 1 drop into the left eye 4 (four) times daily. Start on day of surgery., Starting 3/8/2017, Until Discontinued, Normal       !! - Potential duplicate medications found. Please discuss with provider.          Discharge Procedure Orders (must include Diet, Follow-up, Activity)  No discharge procedures on file.

## 2017-03-09 NOTE — MR AVS SNAPSHOT
[facility]  [address]  [place] [address]  Phone: [phone]  Fax: [phone]                  Cyrus Eckert   3/9/2017 8:00 AM   Office Visit    Description:  Male : 1950   Provider:  Guera Lui MD   Department:  [place] - Ophthalmology           Reason for Visit     Post-op Evaluation           Diagnoses this Visit        Comments    Post-operative state    -  Primary     Nuclear sclerosis, bilateral         PSC (posterior subcapsular cataract), bilateral         Diabetes mellitus type 2 without retinopathy         Refractive amblyopia, right         Refractive error                To Do List           Future Appointments        Provider Department Dept Phone    3/14/2017 3:00 PM Guera Lui MD [facility] [phone]    2017 1:15 PM Guera Lui MD [facility] [phone]      Goals (5 Years of Data)     None      Follow-Up and Disposition     Return in about 1 week (around 3/16/2017) for Post op visit.      [facility] -  Assistance  Registered nurses in the [facility] provide clinical advisement, health education, appointment booking, and other advisory services.  Call for this free service at [phone].             Medications           Message regarding Medications     Verify the changes and/or additions to your medication regime listed below are the same as discussed with your clinician today.  If any of these changes or additions are incorrect, please notify your healthcare provider.             Verify that the below list of medications is an accurate representation of the medications you are currently taking.  If none reported, the list may be blank. If incorrect, please contact your healthcare provider. Carry this list with you in case of emergency.           Current Medications     aspirin (ECOTRIN) 81 MG EC tablet Take 81  mg by mouth once daily.    atorvastatin (LIPITOR) 40 MG tablet Take 1 tablet (40 mg total) by mouth once daily.    b complex vitamins tablet Take 1 tablet by mouth once daily.    BIOTIN ORAL Take by mouth.    clopidogrel (PLAVIX) 75 mg tablet Take 75 mg by mouth once daily.    escitalopram oxalate (LEXAPRO) 20 MG tablet TAKE ONE TABLET BY MOUTH ONCE DAILY    escitalopram oxalate (LEXAPRO) 20 MG tablet TAKE ONE TABLET BY MOUTH ONCE DAILY    ketorolac 0.5% (ACULAR) 0.5 % Drop Place 1 drop into the left eye 4 (four) times daily. Start 1 week before surgery.    lisinopril (PRINIVIL,ZESTRIL) 5 MG tablet TAKE ONE TABLET BY MOUTH ONCE DAILY    metformin (GLUCOPHAGE) 500 MG tablet Take 500 mg by mouth once daily.    metoprolol succinate (TOPROL-XL) 25 MG 24 hr tablet Take 25 mg by mouth once daily.    moxifloxacin (VIGAMOX) 0.5 % ophthalmic solution Place 1 drop into the left eye 4 (four) times daily. Start 1 day before cataract surgery.    multivitamin (ONE DAILY MULTIVITAMIN) per tablet Take 1 tablet by mouth once daily.    omeprazole (PRILOSEC OTC) 20 MG tablet No Sig Provided    prednisoLONE acetate (PRED FORTE) 1 % DrpS Place 1 drop into the left eye 4 (four) times daily. Start on day of surgery.    TRADJENTA 5 mg Tab tablet TAKE ONE TABLET BY MOUTH ONCE DAILY    UBIDECARENONE (COENZYME Q10 ORAL) Take by mouth.    vitamin D 185 MG Tab Take 185 mg by mouth once daily.             Clinical Reference Information           Allergies as of 3/9/2017     No Known Drug Allergies      Immunizations Administered on Date of Encounter - 3/9/2017     None      Instructions      Continue drops 4x per day (Vigamox, Prednisolone, Ketorolac).    Call MD immediately if signs of infection occur (pain, redness, blurred vision). Do not wait for next appointment.    Call MD immediately if you experience new floaters/shadows in your vision, flashes of light, or a curtain or veil appearing to in your vision.    Cell number [phone].         Language Assistance Services     ATTENTION: Language assistance services are available, free of charge. Please call [phone].      ATENCIÓN: Si habla judie, tiene a allen disposición servicios gratuitos de asistencia lingüística. Llame al [phone].     CHÚ Ý: N?u b?n nói Ti?ng Vi?t, có các d?ch v? h? tr? ngôn ng? mi?n phí dành cho b?n. G?i s? [phone].         [facility] complies with applicable Federal civil rights laws and does not discriminate on the basis of race, color, national origin, age, disability, or sex.

## 2017-03-14 ENCOUNTER — OFFICE VISIT (OUTPATIENT)
Dept: OPHTHALMOLOGY | Facility: CLINIC | Age: 67
End: 2017-03-14
Payer: MEDICARE

## 2017-03-14 DIAGNOSIS — H25.13 NUCLEAR SCLEROSIS, BILATERAL: ICD-10-CM

## 2017-03-14 DIAGNOSIS — E11.9 DIABETES MELLITUS TYPE 2 WITHOUT RETINOPATHY: ICD-10-CM

## 2017-03-14 DIAGNOSIS — H53.021 REFRACTIVE AMBLYOPIA, RIGHT: ICD-10-CM

## 2017-03-14 DIAGNOSIS — Z98.890 POST-OPERATIVE STATE: Primary | ICD-10-CM

## 2017-03-14 DIAGNOSIS — H52.7 REFRACTIVE ERROR: ICD-10-CM

## 2017-03-14 PROCEDURE — 99213 OFFICE O/P EST LOW 20 MIN: CPT | Mod: PBBFAC,PO | Performed by: OPHTHALMOLOGY

## 2017-03-14 PROCEDURE — 99024 POSTOP FOLLOW-UP VISIT: CPT | Mod: ,,, | Performed by: OPHTHALMOLOGY

## 2017-03-14 PROCEDURE — 99999 PR PBB SHADOW E&M-EST. PATIENT-LVL III: CPT | Mod: PBBFAC,,, | Performed by: OPHTHALMOLOGY

## 2017-03-14 NOTE — MR AVS SNAPSHOT
[facility]  [address]  [place] [address]  Phone: [phone]  Fax: [phone]                  Cyrus Eckert   3/14/2017 3:00 PM   Office Visit    Description:  Male : 1950   Provider:  Guera Lui MD   Department:  [place] - Ophthalmology           Reason for Visit     Post-op Evaluation           Diagnoses this Visit        Comments    Post-operative state    -  Primary     Nuclear sclerosis, bilateral         PSC (posterior subcapsular cataract), bilateral         Diabetes mellitus type 2 without retinopathy         Refractive amblyopia, right         Refractive error                To Do List           Future Appointments        Provider Department Dept Phone    2017 1:15 PM Guera Lui MD [facility] [phone]      Goals (5 Years of Data)     None      [facility]     [facility] Care Line -  Assistance  Registered nurses in the [facility] provide clinical advisement, health education, appointment booking, and other advisory services.  Call for this free service at [phone].             Medications           Message regarding Medications     Verify the changes and/or additions to your medication regime listed below are the same as discussed with your clinician today.  If any of these changes or additions are incorrect, please notify your healthcare provider.             Verify that the below list of medications is an accurate representation of the medications you are currently taking.  If none reported, the list may be blank. If incorrect, please contact your healthcare provider. Carry this list with you in case of emergency.           Current Medications     aspirin (ECOTRIN) 81 MG EC tablet Take 81 mg by mouth once daily.    atorvastatin (LIPITOR) 40 MG tablet Take 1 tablet (40 mg total) by mouth once daily.    b complex vitamins tablet Take 1 tablet by mouth once daily.    BIOTIN  ORAL Take by mouth.    clopidogrel (PLAVIX) 75 mg tablet Take 75 mg by mouth once daily.    escitalopram oxalate (LEXAPRO) 20 MG tablet TAKE ONE TABLET BY MOUTH ONCE DAILY    escitalopram oxalate (LEXAPRO) 20 MG tablet TAKE ONE TABLET BY MOUTH ONCE DAILY    ketorolac 0.5% (ACULAR) 0.5 % Drop Place 1 drop into the left eye 4 (four) times daily. Start 1 week before surgery.    lisinopril (PRINIVIL,ZESTRIL) 5 MG tablet TAKE ONE TABLET BY MOUTH ONCE DAILY    metformin (GLUCOPHAGE) 500 MG tablet Take 500 mg by mouth once daily.    metoprolol succinate (TOPROL-XL) 25 MG 24 hr tablet Take 25 mg by mouth once daily.    moxifloxacin (VIGAMOX) 0.5 % ophthalmic solution Place 1 drop into the left eye 4 (four) times daily. Start 1 day before cataract surgery.    multivitamin (ONE DAILY MULTIVITAMIN) per tablet Take 1 tablet by mouth once daily.    omeprazole (PRILOSEC OTC) 20 MG tablet No Sig Provided    prednisoLONE acetate (PRED FORTE) 1 % DrpS Place 1 drop into the left eye 4 (four) times daily. Start on day of surgery.    TRADJENTA 5 mg Tab tablet TAKE ONE TABLET BY MOUTH ONCE DAILY    UBIDECARENONE (COENZYME Q10 ORAL) Take by mouth.    vitamin D 185 MG Tab Take 185 mg by mouth once daily.             Clinical Reference Information           Allergies as of 3/14/2017     No Known Drug Allergies      Immunizations Administered on Date of Encounter - 3/14/2017     None      Instructions      Continue Vigamox QID until gone. Taper Prednisolone 3x/day for 1 week then 2x/day for 1 week then 1x/day for 1 week then stop. Continue Ketorolac 4x/day for 1 month.         Language Assistance Services     ATTENTION: Language assistance services are available, free of charge. Please call [phone].      ATENCIÓN: Si habla judie, tiene a allen disposición servicios gratuitos de asistencia lingüística. Llame al [phone].     CHÚ Ý: N?u b?n nói Ti?ng Vi?t, có các d?ch v? h? tr? ngôn ng? mi?n phí dành cho b?n. G?i s?  [phone].         [facility] complies with applicable Federal civil rights laws and does not discriminate on the basis of race, color, national origin, age, disability, or sex.

## 2017-03-14 NOTE — PROGRESS NOTES
HPI     Post-op Evaluation    Additional comments: 1 week s/p phaco iol OS 3/8           Comments   Pt states no pain or irritation. No floaters or flashes.     Gtts: Ketorolac, Prednisolone, Vigamox QID OS       Last edited by Cheryle Quintana on 3/14/2017  2:54 PM. (History)            Assessment /Plan     For exam results, see Encounter Report.    Post-operative state    Nuclear sclerosis, bilateral    PSC (posterior subcapsular cataract), bilateral    Diabetes mellitus type 2 without retinopathy    Refractive amblyopia, right    Refractive error            Nuclear sclerosis, bilateral - POD #6 s/p Phaco/PCIOL OS. Doing well. Continue drops QID until Friday, then Continue Vigamox QID until gone. Taper Prednisolone 3x/day for 1 week then 2x/day for 1 week then 1x/day for 1 week then stop. Continue Ketorolac 4x/day for 1 month.  visually significant OS when combined with PSC. Dilates well, denies flomax, however pupil did come down during surgery OS - recommend Omidria for OD when it comes time. Also rolled eyes up a lot - may benefit from john-bulbar block. Re-evaluate PSC OD undilated at 1 month post-op. D/c Warm compresses, lid hygiene.     PSC (posterior subcapsular cataract), bilateral - OS>>OD. See above.     Diabetes mellitus type 2 without retinopathy - will need Ketorolac 1 week prior    Refractive amblyopia, right - very high cyl, but corrects to 20/40. Discussed toric lens, K topography done to confirm it is regular.     Refractive error - see above. [employer] [facility], pt advised he will need readers for near.

## 2017-03-14 NOTE — PATIENT INSTRUCTIONS
Continue Vigamox QID until gone. Taper Prednisolone 3x/day for 1 week then 2x/day for 1 week then 1x/day for 1 week then stop. Continue Ketorolac 4x/day for 1 month.

## 2017-04-11 ENCOUNTER — OFFICE VISIT (OUTPATIENT)
Dept: OPHTHALMOLOGY | Facility: CLINIC | Age: 67
End: 2017-04-11
Payer: MEDICARE

## 2017-04-11 DIAGNOSIS — H25.13 NUCLEAR SCLEROSIS, BILATERAL: ICD-10-CM

## 2017-04-11 DIAGNOSIS — H53.021 REFRACTIVE AMBLYOPIA, RIGHT: ICD-10-CM

## 2017-04-11 DIAGNOSIS — Z98.890 POST-OPERATIVE STATE: Primary | ICD-10-CM

## 2017-04-11 DIAGNOSIS — H52.7 REFRACTIVE ERROR: ICD-10-CM

## 2017-04-11 DIAGNOSIS — E11.9 DIABETES MELLITUS TYPE 2 WITHOUT RETINOPATHY: ICD-10-CM

## 2017-04-11 PROCEDURE — 99024 POSTOP FOLLOW-UP VISIT: CPT | Mod: ,,, | Performed by: OPHTHALMOLOGY

## 2017-04-11 PROCEDURE — 99999 PR PBB SHADOW E&M-EST. PATIENT-LVL III: CPT | Mod: PBBFAC,,, | Performed by: OPHTHALMOLOGY

## 2017-04-11 PROCEDURE — 99213 OFFICE O/P EST LOW 20 MIN: CPT | Mod: PBBFAC,PO | Performed by: OPHTHALMOLOGY

## 2017-04-11 NOTE — PROGRESS NOTES
HPI     Post-op Evaluation    Additional comments: 1 month sp phaco iol OS d 3/8//  finished drops as   directed//           Comments   1 month sp phaco iol OS d 3/8//  finished drops as directed//  Pt was wondering why we are checking OD for cat because its a bad eye//    Agree with above. Has not noticed any decline OD.        Last edited by Guera Lui MD on 4/11/2017  2:38 PM.   (History)            Assessment /Plan     For exam results, see Encounter Report.    Post-operative state    Nuclear sclerosis, bilateral    Diabetes mellitus type 2 without retinopathy    Refractive amblyopia, right    Refractive error              Nuclear sclerosis, bilateral - 1 month s/p Phaco/PCIOL OS. Doing well. OD stable.   visually significant OS when combined with PSC. Dilates well, denies flomax, however pupil did come down during surgery OS - recommend Omidria for OD when it comes time. Also rolled eyes up a lot - may benefit from john-bulbar block. Ok to resume Warm compresses, lid hygiene.     PSC (posterior subcapsular cataract), bilateral - OS>>OD. See above.     Diabetes mellitus type 2 without retinopathy - will need Ketorolac 1 week prior    Refractive amblyopia, right - very high cyl, but corrects to 20/40. Discussed toric lens, K topography done to confirm it is regular.     Refractive error - see above. [employer] [facility], pt advised he will need readers for near. MRx given.

## 2019-10-14 ENCOUNTER — OFFICE VISIT (OUTPATIENT)
Dept: OPTOMETRY | Facility: CLINIC | Age: 69
End: 2019-10-14
Payer: MEDICARE

## 2019-10-14 DIAGNOSIS — H53.031 STRABISMIC AMBLYOPIA, RIGHT: ICD-10-CM

## 2019-10-14 DIAGNOSIS — H25.11 NUCLEAR SCLEROSIS, RIGHT: ICD-10-CM

## 2019-10-14 DIAGNOSIS — E11.9 DIABETES MELLITUS TYPE 2 WITHOUT RETINOPATHY: Primary | ICD-10-CM

## 2019-10-14 DIAGNOSIS — Z13.5 GLAUCOMA SCREENING: ICD-10-CM

## 2019-10-14 DIAGNOSIS — Z96.1 PSEUDOPHAKIA, LEFT EYE: ICD-10-CM

## 2019-10-14 DIAGNOSIS — H50.331 INTERMITTENT MONOCULAR EXOTROPIA, RIGHT EYE: ICD-10-CM

## 2019-10-14 DIAGNOSIS — H43.812 POSTERIOR VITREOUS DETACHMENT, LEFT: ICD-10-CM

## 2019-10-14 PROCEDURE — 92014 COMPRE OPH EXAM EST PT 1/>: CPT | Mod: S$PBB,,, | Performed by: OPTOMETRIST

## 2019-10-14 PROCEDURE — 99213 OFFICE O/P EST LOW 20 MIN: CPT | Mod: PBBFAC,PO | Performed by: OPTOMETRIST

## 2019-10-14 PROCEDURE — 99999 PR PBB SHADOW E&M-EST. PATIENT-LVL III: ICD-10-PCS | Mod: PBBFAC,,, | Performed by: OPTOMETRIST

## 2019-10-14 PROCEDURE — 99999 PR PBB SHADOW E&M-EST. PATIENT-LVL III: CPT | Mod: PBBFAC,,, | Performed by: OPTOMETRIST

## 2019-10-14 PROCEDURE — 92014 PR EYE EXAM, EST PATIENT,COMPREHESV: ICD-10-PCS | Mod: S$PBB,,, | Performed by: OPTOMETRIST

## 2019-10-14 RX ORDER — GABAPENTIN 300 MG/1
300 CAPSULE ORAL 3 TIMES DAILY
COMMUNITY
End: 2023-09-05

## 2019-10-14 NOTE — LETTER
October 14, 2019        Chang Martinez MD  [address]  [facility] [address]             [place] [facility]  [address] [address]  Phone: [phone]  Fax: [phone]   Patient: Cyrus Eckert   MR Number: [id number]   YOB: 1950   Date of Visit: 10/14/2019       Dear Dr. Martinez:    I examined Cyrus Eckert today in clinic. Attached you will find relevant portions of my assessment and plan of care.    If you have questions, please do not hesitate to call me. I look forward to following Cyrus Eckert along with you.    Sincerely,      JOSEPH Lange, OD            CC  No Recipients    Enclosure

## 2019-10-14 NOTE — PROGRESS NOTES
HPI     Routine diabetic eye exam-dle-2017    Pt denies any blurred vision. Has rx glasses but doesn't wear them, only   wears otc reading glasses. Denies any eye pain. No flashes or floaters.   BSL fluctuates.      Hemoglobin A1C       Date                     Value               Ref Range             Status                08/08/2017               6.8 (H)             0.0 - 5.6 %           Final              Comment:    Reference Interval:  5.0 - 5.6 Normal   5.7 - 6.4 High Risk   > 6.5   Diabetic    Hgb A1c results are standardized based on the (NGSP) National     Glycohemoglobin Standardization Program.    Hemoglobin A1C levels are   related to mean serum/plasma glucose   during the preceding 2-3 months.              01/29/2016               6.7 (H)             0.0 - 5.6 %           Final              Comment:    Reference Interval:  5.0 - 5.6 Normal   5.7 - 6.4 High Risk   > 6.5   Diabetic    Hgb A1c results are standardized based on the (NGSP) National     Glycohemoglobin Standardization Program.    Hemoglobin A1C levels are   related to mean serum/plasma glucose   during the preceding 2-3 months.              06/13/2011               9.1 (H)             4.0 - 6.2 %           Final            ----------    Agree above  Notes ? Last a1c        Last edited by JOSEPH Lange, OD on 10/14/2019 10:20 AM. (History)        ROS     Positive for: Eyes    Negative for: Constitutional, Gastrointestinal, Neurological, Skin,   Genitourinary, Musculoskeletal, HENT, Endocrine, Cardiovascular,   Respiratory, Psychiatric, Allergic/Imm, Heme/Lymph    Last edited by JOSEPH Lange, OD on 10/14/2019 10:20 AM. (History)        Assessment /Plan     For exam results, see Encounter Report.    Diabetes mellitus type 2 without retinopathy    Posterior vitreous detachment, left    Strabismic amblyopia, right    Glaucoma screening    Nuclear sclerosis, right    Intermittent monocular exotropia, right eye    Pseudophakia, left eye      1.  No ret/ no csme, gave info, control glucose, annual DFE  2. RD precautions given---always protect OS  3, 6. Longstanding, w/ bcva 20/60, stable  4. Not suspect  5. Vis sig, but hold CE due to amblyopia, patient without vision complaint OD  7. Stable OS    Ok continue with otc only for near  No new refraction today  Always protect OS w/ safety specs / sun glasses    Discussed and educated patient on current findings /plan.  RTC 1 year, prn if any changes / issues

## 2020-10-14 ENCOUNTER — OFFICE VISIT (OUTPATIENT)
Dept: OPHTHALMOLOGY | Facility: CLINIC | Age: 70
End: 2020-10-14
Payer: MEDICARE

## 2020-10-14 DIAGNOSIS — H53.001 AMBLYOPIA, RIGHT EYE: ICD-10-CM

## 2020-10-14 DIAGNOSIS — H25.041 POSTERIOR SUBCAPSULAR POLAR AGE-RELATED CATARACT OF RIGHT EYE: Primary | ICD-10-CM

## 2020-10-14 DIAGNOSIS — Z96.1 PSEUDOPHAKIA OF LEFT EYE: ICD-10-CM

## 2020-10-14 PROCEDURE — 92015 DETERMINE REFRACTIVE STATE: CPT | Mod: ,,, | Performed by: OPHTHALMOLOGY

## 2020-10-14 PROCEDURE — 99999 PR PBB SHADOW E&M-EST. PATIENT-LVL III: ICD-10-PCS | Mod: PBBFAC,,, | Performed by: OPHTHALMOLOGY

## 2020-10-14 PROCEDURE — 92014 PR EYE EXAM, EST PATIENT,COMPREHESV: ICD-10-PCS | Mod: S$PBB,,, | Performed by: OPHTHALMOLOGY

## 2020-10-14 PROCEDURE — 92014 COMPRE OPH EXAM EST PT 1/>: CPT | Mod: S$PBB,,, | Performed by: OPHTHALMOLOGY

## 2020-10-14 PROCEDURE — 92015 PR REFRACTION: ICD-10-PCS | Mod: ,,, | Performed by: OPHTHALMOLOGY

## 2020-10-14 PROCEDURE — 99999 PR PBB SHADOW E&M-EST. PATIENT-LVL III: CPT | Mod: PBBFAC,,, | Performed by: OPHTHALMOLOGY

## 2020-10-14 PROCEDURE — 99213 OFFICE O/P EST LOW 20 MIN: CPT | Mod: PBBFAC,PO | Performed by: OPHTHALMOLOGY

## 2020-10-14 RX ORDER — METOPROLOL TARTRATE 25 MG/1
25 TABLET, FILM COATED ORAL DAILY
COMMUNITY
Start: 2020-07-08

## 2020-10-14 NOTE — PATIENT INSTRUCTIONS
What Are Cataracts?    A clear lens in the eye focuses light. This lets the eye see images sharply. With age, the lens slowly becomes cloudy. The cloudy lens is a cataract. A cataract scatters light and makes it hard for the eye to focus. Cataracts often form in both eyes. But one lens may cloud faster than the other.  The aging of your lens  Your lens may cloud so slowly that you don`t notice any vision changes at first. But as the cataract gets worse, the eye has a harder time focusing. In early stages, glasses may help you see better. As the lens gets more cloudy, your doctor may recommend surgery to restore your vision.  A clear lens allows your eye to bring objects sharply into focus.  A mild cataract may slightly blur your vision.  A dense cataract can severely blur your vision.  Date Last Reviewed: 6/14/2015  © 8209-0549 [employer] [address] [place] [address], [place] [address]. All rights reserved. This information is not intended as a substitute for professional medical care. Always follow your healthcare professional's instructions.

## 2020-10-14 NOTE — PROGRESS NOTES
HPI     DLE- 10/14/19     Pt is here for routine check up. Pt has been having issues with OD for   awhile. Va is hazy. Pt did not fill last Rx given. Pt currently uses otc   readers prn. DM and htn are both controlled.     Hemoglobin A1C       Date                     Value               Ref Range             Status                08/08/2017               6.8 (H)             0.0 - 5.6 %           Final               01/29/2016               6.7 (H)             0.0 - 5.6   %         Final                 06/13/2011               9.1 (H)             4.0 - 6.2 %           Final            ----------      Last edited by Alejandro Bach on 10/14/2020 10:00 AM. (History)        ROS     Negative for: Constitutional, Gastrointestinal, Neurological, Skin,   Genitourinary, Musculoskeletal, HENT, Endocrine, Cardiovascular, Eyes,   Respiratory, Psychiatric, Allergic/Imm, Heme/Lymph    Last edited by Dorian Harris Jr., MD on 10/14/2020 10:54 AM. (History)        Assessment /Plan     For exam results, see Encounter Report.    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    Amblyopia, right eye    Pseudophakia of left eye      -no decrease in ADLS, no significant glare, and functionality is satisfactory  -counseled on what to expect if the cataracts worsening and how it can effect the vision  -continue to monitor and if vision changes patient can call for another appointment  -patient doesn't want surgery on right eye; explained to patient if it gets too difficult to exam the posterior segment OD then will strongly recommend to   Have cataract surgery to be able to examine the posterior segment of the right eye but also told the patient that cataract surgery will most probably not improve  Vision in the right eye  Follow up in about 1 year (around 10/14/2021) for check cataract right eye.

## 2020-12-07 ENCOUNTER — CLINICAL SUPPORT (OUTPATIENT)
Dept: URGENT CARE | Facility: CLINIC | Age: 70
End: 2020-12-07
Payer: MEDICARE

## 2020-12-07 DIAGNOSIS — Z20.822 EXPOSURE TO COVID-19 VIRUS: Primary | ICD-10-CM

## 2020-12-07 LAB
CTP QC/QA: YES
SARS-COV-2 RDRP RESP QL NAA+PROBE: NEGATIVE

## 2020-12-07 PROCEDURE — U0002 COVID-19 LAB TEST NON-CDC: HCPCS | Mod: QW,S$GLB,, | Performed by: FAMILY MEDICINE

## 2020-12-07 PROCEDURE — U0002: ICD-10-PCS | Mod: QW,S$GLB,, | Performed by: FAMILY MEDICINE

## 2020-12-07 NOTE — PROGRESS NOTES
-CDC Testing and Quarantine Guidelines for patients with exposure to a known-positive COVID-19 person:  A close exposure is defined as anyone who has had an exposure (masked or unmasked) to a known COVID -19 positive person within 6 ft for longer than 15 minutes. If your exposure meets this definition you are required by CDC guidelines to quarantine for at least 7-10 days from time of exposure. The CDC states that a test can be performed for an asymptomatic patient (someone who does not have any symptoms) after a close exposure, and that a test should be done if you develop symptoms after a close exposure as described above.  Specifically, you can test at day 5 or later if asymptomatic, in order to get released from quarantine on day 7 or later.  If you develop symptoms sooner, you should test when your symptoms start.  -If you meet the definition of a close exposure, it will not matter whether you are experiencing symptoms- a quarantine for at least 7-10 days after a close exposure is required by CDC guidelines.  -Please note, if you decide to test as an asymptomatic during your quarantine and you are positive, you will be restarting your quarantine and moving from a possible 10 day quarantine (if you do not test), to a 11 day or greater quarantine.  -The CDC also suggests people still monitor for symptoms for a full 14 days and remember that the shorter quarantine options do not replace initial CDC guidance.  The CDC continues to recommend quarantining for 14 days as the best way to reduce risk for spreading COVID-19 - however, this is only a recommendation.  -If your exposure does not meet the above definition, you can return to your normal daily activities to include social distancing, wearing a mask and frequent handwashing.

## 2020-12-21 ENCOUNTER — PATIENT MESSAGE (OUTPATIENT)
Dept: OTHER | Facility: OTHER | Age: 70
End: 2020-12-21

## 2021-05-10 ENCOUNTER — PATIENT MESSAGE (OUTPATIENT)
Dept: RESEARCH | Facility: HOSPITAL | Age: 71
End: 2021-05-10

## 2021-08-17 NOTE — INTERVAL H&P NOTE
[facility] Medicine Services      Patient Name: Niko Servin  : 1928  MRN: [id number]  Primary Care Physician:  Casper Saldaña MD  Date of admission: 2021      Subjective      Chief Complaint: AMS    History of Present Illness: Niko Servin is a 89 y.o. male w/PMH of B-cell lymphoma, CAD, MVR W/mechanical valve long-term anticoagulation, depression, [place], HTN presents to [facility] ED due to left lower extremity cellulitis worsening for the past 2 weeks since he injured foot with worsening dmenetia.  Patient started on broad spectrum abx, ID consulted.  Left great toe had necrosis and malodor concerning for osteomyelitis.  Due to elevated INR, vitamin K given, cards replaced coumadin with heparin drip.  Vascular workup showed significant infrapopliteal disease in LLE.   Podiatry performed left great toe ampuation and foot showed signs of improvement.  However, patient's mental status and condition continued to deteriorate despite intervention from ID, vascular, Podiatry, cardiology,ane Pulm.  Vascular was unable to perform intervention on 2021.  Suspect worsening status due to poor penetration of abx and worsening of disease in setting of underlying dementia.  Patient was also bacteremic with proteus 2/2 osteomyelitis as tissue grew proteus and staph.  Family elected to make patient comfort care with hospice consult on 2021.  Patient discharged and made GIP hospice on 2021    ROS   Unable to obtain due to altered mental status    Personal History     Past Medical History:   Diagnosis Date   • Ataxia    • CAD (coronary artery disease)    • CHF (congestive heart failure) (CMS/HCC)    • Depression    • Hx of mitral valve replacement    • Long term (current) use of anticoagulants    • Low grade B cell lymphoproliferative disorder (CMS/HCC)    • Mass of soft tissue     Of left arm   • Myocardial infarction (CMS/HCC)    • Postural hypotension   The patient has been examined and the H&P has been reviewed:    I concur with the findings and no changes have occurred since H&P was written.    Anesthesia/Surgery risks, benefits and alternative options discussed and understood by patient/family.    Vitals:    03/08/17 0740   BP: 129/74   Pulse: 60   Resp: 16   Temp: 97.9 °F (36.6 °C)             Active Hospital Problems    Diagnosis  POA    Nuclear sclerosis [H25.10]  Yes      Resolved Hospital Problems    Diagnosis Date Resolved POA   No resolved problems to display.        • Problems with hearing    • Protein calorie malnutrition (CMS/HCC)    • Unspecified injury of head, initial encounter        Past Surgical History:   Procedure Laterality Date   • AMPUTATION DIGIT Left 8/12/2021    Procedure: AMPUTATION DIGIT Left great toe;  Surgeon: CHITO Woo DPM;  Location: [facility] MAIN OR;  Service: Podiatry;  Laterality: Left;   • VEIN BYPASS SURGERY         Family History: family history is not on file. Otherwise pertinent FHx was reviewed and not pertinent to current issue.    Social History:  reports that he has quit smoking. He has never used smokeless tobacco. He reports current alcohol use of about 5.0 standard drinks of alcohol per week. He reports that he does not use drugs.    Home Medications:  Prior to Admission Medications     Prescriptions Last Dose Informant Patient Reported? Taking?    sertraline (ZOLOFT) 100 MG tablet   No No    TAKE ONE TABLET BY MOUTH DAILY    warfarin (COUMADIN) 7.5 MG tablet   No No    TAKE ONE TABLET BY MOUTH DAILY            Allergies:  No Known Allergies    Objective      Vitals:   Temp:  [97.7 °F (36.5 °C)-98.3 °F (36.8 °C)] 98.3 °F (36.8 °C)  Heart Rate:  [75-83] 80  Resp:  [18-22] 22  BP: (131-156)/(50-66) 131/61  Flow (L/min):  [3-4] 3    Physical Exam   Constitutional:       General: He is not in acute distress.  AAOx0-1 with poor insight     Appearance: Normal appearance. He is well-developed. He is not ill-appearing, toxic-appearing or diaphoretic.   HENT:      Head: Normocephalic and atraumatic.     Ears without abnormality.  Extremely hard of hearing.     Nose: Nose normal. No congestion or rhinorrhea.      Mouth/Throat:      Mouth: Mucous membranes are moist.      Pharynx: No oropharyngeal exudate.   Eyes:      General: No scleral icterus.        Right eye: No discharge.         Left eye: No discharge.      Extraocular Movements: Extraocular movements intact.      Conjunctiva/sclera: Conjunctivae normal.      Pupils: Pupils are equal,  round, and reactive to light.   Neck:      Thyroid: No thyromegaly.      Vascular: No carotid bruit or JVD.      Trachea: No tracheal deviation.   Cardiovascular:      Rate and Rhythm: Normal rate and regular rhythm.      Pulses: Normal pulses.      Heart sounds: Normal heart sounds. No murmur heard.   No friction rub. No gallop.    Pulmonary:      Effort: Pulmonary effort is normal. No respiratory distress.      Breath sounds: Normal breath sounds. No stridor. No wheezing, rhonchi or rales.   Chest:      Chest wall: No tenderness.   Abdominal:      General: Bowel sounds are normal. There is no distension.      Palpations: Abdomen is soft. There is no mass.      Tenderness: There is no abdominal tenderness. There is no guarding or rebound.      Hernia: No hernia is present.   Musculoskeletal:         General: No swelling, tenderness, deformity or signs of injury. Normal range of motion.      Cervical back: Normal range of motion and neck supple. No rigidity. No muscular tenderness.      Right lower leg: No edema.      Left lower leg: No edema. Pulses palpable but poor     Comments: S/p left great toe amputation.  Covered with compression dressing.  Lymphadenopathy:      Cervical: No cervical adenopathy.   Skin:     General: Skin is warm and dry.      Coloration: Skin is not jaundiced or pale.      Findings: No bruising, erythema or rash.   Neurological:      General: No focal deficit present.      Mental Status: AAOx1-2 with poor insight, dementia and baseline per wife   Cranial Nerves: No cranial nerve deficit.      Sensory: No sensory deficit.      Motor: No weakness or abnormal muscle tone.      Coordination: Coordination normal.   Psychiatric:      Comments: Patient AAOx0-1 with poor insight    Result Review    Result Review:  I have personally reviewed the results from the time of this admission to 8/17/2021 17:29 EDT and agree with these findings:  [x]  Laboratory  [x]  Microbiology  []  Radiology  []   EKG/Telemetry   []  Cardiology/Vascular   []  Pathology  []  Old records  []  Other:        Assessment/Plan        Active Hospital Problems:  Active Hospital Problems    Diagnosis    • Hospice care      Plan:       #Left great toe osteomyelitis   #Left great toe wet gangrene  #Bacteremia  #Mitral valve replacement history  #B cell lymphoma  #Peripheral Vascular disease, severe  #Dementia  #Delirium  #hydronephrosis  #Coumadin toxicity  #Hypokalemia  #Hypophosphatemia  #acute blood loss anemia  #HTN  #CAD  #Depression       -Patient made GIP hospice    - Haldol 1mg IV q6h PRN agitations    -Ativan 1mg IV q4h PRN anxiety    -Morphine 2mg IV q4h PRN severe pain or dyspnea    - Robinul QID    DVT prophylaxis:  Mechanical DVT prophylaxis orders are present.    CODE STATUS:    Level Of Support Discussed With: Health Care Surrogate  Code Status: No CPR  Medical Interventions (Level of Support Prior to Arrest): Comfort Measures    Admission Status:  I believe this patient meets inpatient status.    I discussed the patient's findings and my recommendations with family and nursing staff.    This patient has been examined wearing appropriate Personal Protective Equipment and discussed with Nursing staff. 08/17/21      Signature: Electronically signed by Alec Husain DO, 08/17/21, 5:32 PM EDT.

## 2021-10-29 ENCOUNTER — TELEPHONE (OUTPATIENT)
Dept: DERMATOLOGY | Facility: CLINIC | Age: 71
End: 2021-10-29
Payer: MEDICARE

## 2021-10-29 ENCOUNTER — OFFICE VISIT (OUTPATIENT)
Dept: URGENT CARE | Facility: CLINIC | Age: 71
End: 2021-10-29
Payer: MEDICARE

## 2021-10-29 VITALS
HEIGHT: 72 IN | SYSTOLIC BLOOD PRESSURE: 145 MMHG | RESPIRATION RATE: 17 BRPM | DIASTOLIC BLOOD PRESSURE: 83 MMHG | BODY MASS INDEX: 30.2 KG/M2 | WEIGHT: 223 LBS | OXYGEN SATURATION: 100 % | HEART RATE: 81 BPM | TEMPERATURE: 99 F

## 2021-10-29 DIAGNOSIS — L71.0 DERMATITIS, PERIORAL: Primary | ICD-10-CM

## 2021-10-29 PROCEDURE — 99203 OFFICE O/P NEW LOW 30 MIN: CPT | Mod: S$GLB,,, | Performed by: FAMILY MEDICINE

## 2021-10-29 PROCEDURE — 99203 PR OFFICE/OUTPT VISIT, NEW, LEVL III, 30-44 MIN: ICD-10-PCS | Mod: S$GLB,,, | Performed by: FAMILY MEDICINE

## 2021-10-29 RX ORDER — ERYTHROMYCIN 5 MG/G
OINTMENT OPHTHALMIC 3 TIMES DAILY
Qty: 3.5 G | Refills: 2 | Status: SHIPPED | OUTPATIENT
Start: 2021-10-29

## 2021-10-29 RX ORDER — DOXYCYCLINE 100 MG/1
100 CAPSULE ORAL 2 TIMES DAILY
Qty: 20 CAPSULE | Refills: 0 | Status: SHIPPED | OUTPATIENT
Start: 2021-10-29 | End: 2021-10-29

## 2021-10-29 RX ORDER — DOXYCYCLINE 100 MG/1
100 CAPSULE ORAL 2 TIMES DAILY
Qty: 20 CAPSULE | Refills: 0 | Status: SHIPPED | OUTPATIENT
Start: 2021-10-29 | End: 2021-11-08

## 2021-10-29 RX ORDER — ERYTHROMYCIN 5 MG/G
OINTMENT OPHTHALMIC 3 TIMES DAILY
Qty: 3.5 G | Refills: 2 | Status: SHIPPED | OUTPATIENT
Start: 2021-10-29 | End: 2021-10-29

## 2022-06-06 ENCOUNTER — OFFICE VISIT (OUTPATIENT)
Dept: OPTOMETRY | Facility: CLINIC | Age: 72
End: 2022-06-06
Payer: MEDICARE

## 2022-06-06 DIAGNOSIS — E11.9 DIABETES MELLITUS TYPE 2 WITHOUT RETINOPATHY: Primary | ICD-10-CM

## 2022-06-06 DIAGNOSIS — H53.021 REFRACTIVE AMBLYOPIA, RIGHT: ICD-10-CM

## 2022-06-06 DIAGNOSIS — E11.36 CATARACT ASSOCIATED WITH TYPE 2 DIABETES MELLITUS: ICD-10-CM

## 2022-06-06 DIAGNOSIS — Z13.5 GLAUCOMA SCREENING: ICD-10-CM

## 2022-06-06 DIAGNOSIS — Z96.1 PSEUDOPHAKIA, LEFT EYE: ICD-10-CM

## 2022-06-06 DIAGNOSIS — H43.812 POSTERIOR VITREOUS DETACHMENT, LEFT: ICD-10-CM

## 2022-06-06 PROCEDURE — 99999 PR PBB SHADOW E&M-EST. PATIENT-LVL III: CPT | Mod: PBBFAC,,, | Performed by: OPTOMETRIST

## 2022-06-06 PROCEDURE — 99999 PR PBB SHADOW E&M-EST. PATIENT-LVL III: ICD-10-PCS | Mod: PBBFAC,,, | Performed by: OPTOMETRIST

## 2022-06-06 PROCEDURE — 92014 PR EYE EXAM, EST PATIENT,COMPREHESV: ICD-10-PCS | Mod: S$PBB,,, | Performed by: OPTOMETRIST

## 2022-06-06 PROCEDURE — 99213 OFFICE O/P EST LOW 20 MIN: CPT | Mod: PBBFAC,PO | Performed by: OPTOMETRIST

## 2022-06-06 PROCEDURE — 92014 COMPRE OPH EXAM EST PT 1/>: CPT | Mod: S$PBB,,, | Performed by: OPTOMETRIST

## 2022-06-06 NOTE — PROGRESS NOTES
HPI     Pt here for diabetic eye exam. LDE- 10/14/2020    Pt sts VA OD is blurry. Pt sts he does have a cataract in OD but he has   never saw good out of that eye. Pt denies refraction. Pt wearing +2.00   readers with relief. Pt denies flashes/floaters/pain. Pt denies use of   gtt.     Hemoglobin A1C       Date                     Value               Ref Range             Status                08/08/2017               6.8 (H)             0.0 - 5.6 %           Final                    01/29/2016               6.7 (H)             0.0 - 5.6 %           Final                  06/13/2011               9.1 (H)             4.0 - 6.2 %           Final                  Last edited by Melvina Butcher on 6/6/2022  2:33 PM. (History)        ROS     Positive for: Eyes    Negative for: Constitutional, Gastrointestinal, Neurological, Skin,   Genitourinary, Musculoskeletal, HENT, Endocrine, Cardiovascular,   Respiratory, Psychiatric, Allergic/Imm, Heme/Lymph    Last edited by JOSEPH Lange, OD on 6/6/2022  2:57 PM. (History)        Assessment /Plan     For exam results, see Encounter Report.    Diabetes mellitus type 2 without retinopathy    Cataract associated with type 2 diabetes mellitus    Refractive amblyopia, right    Glaucoma screening    Posterior vitreous detachment, left    Pseudophakia, left eye      1. OS view only ---No teddy/ retinopathy, no csme, gave Diabetic Retinopathy info, control glucose and BP  Advise annual DFE  2. Densely vis sig brunescent NS OD  3. Longstanding   4. Not suspect  5. RD precautions given and reviewed. Patient knows to call/ message if any further changes in symptoms occur.  6. Stable OS    Always protect OS w/ safety specs / sun glasses    Discussed and educated patient on current findings /plan.  RTC 1 year, prn if any changes / issues

## 2022-06-06 NOTE — PATIENT INSTRUCTIONS
"DRY EYES -- BURNING OR ALEXIS SYMPTOMS:  Use Over The Counter artificial tears as needed for dry eye symptoms.   Some common brands include:  Systane, Optive, Refresh, and Thera-Tears.  These drops can be used as frequently as desired, but may be most helpful use during long periods of concentrated work.  For example, reading / working at the computer. Start with 3-4x per day.     Nighttime Ophthalmic gel or ointments are available: Refresh PM, Genteal, and Lacrilube.    Avoid drops that "get redness out" (Visine, Murine, Clear Eyes), as these may contain medication that could further irritate the eyes, especially with chronic use.    ALLERGY EYES -- ITCHING SYMPTOMS:  Over the counter medications include--Pataday, Zaditor, and Alaway.  Use as directed 1-2 drops daily for symptoms of itching / watering eyes.  These drops will not help for dry eye or exposure symptoms.    REDNESS RELIEF:  Lumify---is a good redness reliever that will not cause irritation if used chronically.        DIABETES AND THE EYE / DIABETIC RETINOPATHY    Diabetic retinopathy is a condition occurring in persons with diabetes, which causes progressive damage to the retina, the light sensitive lining at the back of the eye. It is a serious sight-threatening complication of diabetes.    Diabetic retinopathy is the result of damage to the tiny blood vessels that nourish the retina. They leak blood and other fluids that cause swelling of retinal tissue and clouding of vision. The condition usually affects both eyes. The longer a person has diabetes, the more likely they will develop diabetic retinopathy. If left untreated, diabetic retinopathy can cause blindness.  There are two basic types of diabetic retinopathy:    Background or nonproliferative diabetic retinopathy (NPDR)  Nonproliferative diabetic retinopathy (NPDR) is the earliest stage of diabetic retinopathy. With this condition, damaged blood vessels in the retina begin to leak extra fluid " and small amounts of blood into the eye. Sometimes, deposits of cholesterol or other fats from the blood may leak into the retina. Many people with diabetes have mild NPDR, which usually does not affect their vision. However, if their vision is affected, it is the result of macular edema and macular ischemia.    If vision is affected due to macular changes, a consult with a Retina Specialist may be advised.  This is an ophthalmologist that treats retina conditions, including diabetic retinopathy.     Proliferative diabetic retinopathy (PDR)  Proliferative diabetic retinopathy (PDR) mainly occurs when many of the blood vessels in the retina close, preventing enough blood flow. In an attempt to supply blood to the area where the original vessels closed, the retina responds by growing new blood vessels. This is called neovascularization. However, these new blood vessels are abnormal and do not supply the retina with proper blood flow. The new vessels are also often accompanied by scar tissue that may cause the retina to wrinkle or detach. PDR may cause more severe vision loss than NPDR because it can affect both central and peripheral vision.     A patient diagnosed with proliferative diabetic eye disease will be referred to a retinal specialist for consultation.    Often there are no visual symptoms in the early stages of diabetic retinopathy. That is why our eye care professionals recommend that everyone with diabetes have a comprehensive dilated eye examination once a year. Early detection and treatment can limit the potential for significant vision loss from diabetic retinopathy.       Using the Amsler Grid  If you are at risk for vision loss, you may be told to check your eyesight regularly using the Amsler grid. Below is the grid and instructions for using it.         The Amsler grid helps you track changes in your vision.    How to Use the Amsler Grid  Use the grid in a well-lighted area.  Wear glasses or  contact lenses if you usually wear them.  Hold the grid at your normal reading distance (about 16 inches).  Cover your left eye.  With your right eye, look at the dot in the center of the grid.  While looking at the dot, notice if any of the lines look wavy, if any lines disappear, or if the boxes change shape.  Write down on a piece of paper any vision changes from the last time you used the grid.  Now repeat the exercise, this time covering your right eye.  Call your doctor right away if you notice any vision changes.  How Often Should I Check My Vision?  Use the Amsler grid as often as your eye doctor suggests. Keep the grid where youll remember to use it. Call your eye doctor right away if you notice any changes with your eyesight. This includes if your vision improves.  © 6001-6104 Laura Ordaz, [address] [place] [address], [place] [address]. All rights reserved. This information is not intended as a substitute for professional medical care. Always follow your healthcare professional's instructions.

## 2023-04-13 ENCOUNTER — OFFICE VISIT (OUTPATIENT)
Dept: OPTOMETRY | Facility: CLINIC | Age: 73
End: 2023-04-13
Payer: MEDICARE

## 2023-04-13 DIAGNOSIS — H43.812 POSTERIOR VITREOUS DETACHMENT, LEFT: ICD-10-CM

## 2023-04-13 DIAGNOSIS — Z13.5 GLAUCOMA SCREENING: ICD-10-CM

## 2023-04-13 DIAGNOSIS — Z96.1 PSEUDOPHAKIA, LEFT EYE: ICD-10-CM

## 2023-04-13 DIAGNOSIS — E11.36 CATARACT ASSOCIATED WITH TYPE 2 DIABETES MELLITUS: ICD-10-CM

## 2023-04-13 DIAGNOSIS — E11.9 DIABETES MELLITUS TYPE 2 WITHOUT RETINOPATHY: Primary | ICD-10-CM

## 2023-04-13 DIAGNOSIS — H53.021 REFRACTIVE AMBLYOPIA, RIGHT: ICD-10-CM

## 2023-04-13 PROCEDURE — 99999 PR PBB SHADOW E&M-EST. PATIENT-LVL III: CPT | Mod: PBBFAC,,, | Performed by: OPTOMETRIST

## 2023-04-13 PROCEDURE — 92014 COMPRE OPH EXAM EST PT 1/>: CPT | Mod: S$PBB,,, | Performed by: OPTOMETRIST

## 2023-04-13 PROCEDURE — 99213 OFFICE O/P EST LOW 20 MIN: CPT | Mod: PBBFAC,PO | Performed by: OPTOMETRIST

## 2023-04-13 PROCEDURE — 92014 PR EYE EXAM, EST PATIENT,COMPREHESV: ICD-10-PCS | Mod: S$PBB,,, | Performed by: OPTOMETRIST

## 2023-04-13 PROCEDURE — 99999 PR PBB SHADOW E&M-EST. PATIENT-LVL III: ICD-10-PCS | Mod: PBBFAC,,, | Performed by: OPTOMETRIST

## 2023-04-13 RX ORDER — MV/FA/DHA/EPA/FISH OIL/SAW/GNK 400MCG-200
COMBINATION PACKAGE (EA) ORAL
COMMUNITY
End: 2023-09-05 | Stop reason: ALTCHOICE

## 2023-04-13 RX ORDER — CHOLECALCIFEROL (VITAMIN D3) 25 MCG
1000 TABLET ORAL
COMMUNITY

## 2023-04-13 RX ORDER — METFORMIN HYDROCHLORIDE 1000 MG/1
1000 TABLET ORAL 2 TIMES DAILY
COMMUNITY
Start: 2023-01-24

## 2023-04-13 NOTE — PROGRESS NOTES
HPI    Last DFE 6/6/22  + DM2  + HTN  + Amblyopia   + Phaco OS    LBS: Does not check at home    Denies changes in VA since last visit, flashes, floaters, pain, or use of   gtts.  Pt. States he would like to discuss cataract sx OD.     Hemoglobin A1C       Date                     Value               Ref Range             Status                08/08/2017               6.8 (H)             0.0 - 5.6 %           Final                 01/29/2016               6.7 (H)             0.0 - 5.6 %           Final                 06/13/2011               9.1 (H)             4.0 - 6.2 %           Final            Last edited by Melvina Eason on 4/13/2023  2:11 PM.            Assessment /Plan     For exam results, see Encounter Report.    Diabetes mellitus type 2 without retinopathy    Refractive amblyopia, right    Cataract associated with type 2 diabetes mellitus    Glaucoma screening    Posterior vitreous detachment, left    Pseudophakia, left eye         1. OS view only ---No teddy/ retinopathy, no csme, gave Diabetic Retinopathy info, control glucose and BP  Advise annual DFE  2. Longstanding w/ bcva 20/50 PRIOR to dense cataract formation   3. Densely vis sig brunescent NS OD---pt had deferred consult in the past, but now wants to reconsider options ---schedule for consult   4. Not suspect  5. RD precautions given and reviewed. Patient knows to call/ message if any further changes in symptoms occur.  6. Stable OS     Always protect OS w/ safety specs / sun glasses     Discussed and educated patient on current findings /plan.  RTC cataract consult ----prn if any changes / issues

## 2023-06-28 ENCOUNTER — TELEPHONE (OUTPATIENT)
Dept: OPHTHALMOLOGY | Facility: CLINIC | Age: 73
End: 2023-06-28
Payer: MEDICARE

## 2023-08-21 ENCOUNTER — TELEPHONE (OUTPATIENT)
Dept: OPHTHALMOLOGY | Facility: CLINIC | Age: 73
End: 2023-08-21

## 2023-08-21 ENCOUNTER — OFFICE VISIT (OUTPATIENT)
Dept: OPHTHALMOLOGY | Facility: CLINIC | Age: 73
End: 2023-08-21
Payer: MEDICARE

## 2023-08-21 DIAGNOSIS — H53.001 AMBLYOPIA, RIGHT: ICD-10-CM

## 2023-08-21 DIAGNOSIS — H25.11 NUCLEAR SCLEROTIC CATARACT OF RIGHT EYE: Primary | ICD-10-CM

## 2023-08-21 DIAGNOSIS — H25.11 CATARACTA BRUNESCENS OF RIGHT EYE: ICD-10-CM

## 2023-08-21 PROCEDURE — 99999 PR PBB SHADOW E&M-EST. PATIENT-LVL III: CPT | Mod: PBBFAC,,, | Performed by: OPHTHALMOLOGY

## 2023-08-21 PROCEDURE — 76519 AXIAL EYE LENGTH  - OU - BOTH EYES: ICD-10-PCS | Mod: 26,S$PBB,, | Performed by: OPHTHALMOLOGY

## 2023-08-21 PROCEDURE — 76519 ECHO EXAM OF EYE: CPT | Mod: PBBFAC | Performed by: OPHTHALMOLOGY

## 2023-08-21 PROCEDURE — 99215 OFFICE O/P EST HI 40 MIN: CPT | Mod: S$PBB,,, | Performed by: OPHTHALMOLOGY

## 2023-08-21 PROCEDURE — 99999 PR PBB SHADOW E&M-EST. PATIENT-LVL III: ICD-10-PCS | Mod: PBBFAC,,, | Performed by: OPHTHALMOLOGY

## 2023-08-21 PROCEDURE — 99213 OFFICE O/P EST LOW 20 MIN: CPT | Mod: PBBFAC | Performed by: OPHTHALMOLOGY

## 2023-08-21 PROCEDURE — 99215 PR OFFICE/OUTPT VISIT, EST, LEVL V, 40-54 MIN: ICD-10-PCS | Mod: S$PBB,,, | Performed by: OPHTHALMOLOGY

## 2023-08-21 RX ORDER — PREDNISOLONE ACETATE-GATIFLOXACIN-BROMFENAC .75; 5; 1 MG/ML; MG/ML; MG/ML
1 SUSPENSION/ DROPS OPHTHALMIC 3 TIMES DAILY
Qty: 5 ML | Refills: 3 | Status: SHIPPED | OUTPATIENT
Start: 2023-08-21 | End: 2023-08-21

## 2023-08-21 RX ORDER — PREDNISOLONE ACETATE-GATIFLOXACIN-BROMFENAC .75; 5; 1 MG/ML; MG/ML; MG/ML
1 SUSPENSION/ DROPS OPHTHALMIC 3 TIMES DAILY
Qty: 5 ML | Refills: 3 | Status: SHIPPED | OUTPATIENT
Start: 2023-08-21

## 2023-08-21 NOTE — PROGRESS NOTES
HPI    Dr. Lange    Diabetes mellitus type 2 without retinopathy  Refractive amblyopia, right  Cataract associated with type 2 diabetes mellitus  Posterior vitreous detachment, left  Pseudophakia, left eye    GTTS: None    Pt is here today for cat eval OD. Pt states his OD has always been blurry   even as a child. Blurred va at both distance and near.   Last edited by Yumiko Gorman MD on 8/21/2023 10:56 AM.            Assessment /Plan     For exam results, see Encounter Report.    Nuclear sclerotic cataract of right eye  -     Axial Eye Length - OU - Both Eyes  -     Case Request Operating Room: EXTRACTION, CATARACT, WITH IOL INSERTION  -     prednisolon/gatiflox/bromfenac (PREDNISOL ACE-GATIFLOX-BROMFEN) 1-0.5-0.075 % DrpS; Apply 1 drop to eye 3 (three) times daily.  Dispense: 5 mL; Refill: 3    Amblyopia, right    Cataracta brunescens of right eye  -     B Scan      Visually significant nuclear sclerotic cataract   - Interfering with activities of daily living.  Pt desires cataract surgery for Va rehabilitation.   - R/B/A discussed and pt agrees to proceed with surgery.   - IOL options discussed according to patient's goals and concomitant ocular pathology; and pt content with monofocal lens.    - Target: plano.    [id number] 21.0 range OD -- CONFIRM THAT PT DESIRES TORIC LENS  Ora    Patient has regular astigmatism that is visually significant and wishes to have an astigmatism correcting TORIC lens placed, and agrees to the out of pocket cost of $1500 per eye      Pciol OS - doing well    Refractive ambylopia OD  - BCVA 20/40 per review of prior notes.

## 2023-08-23 ENCOUNTER — TELEPHONE (OUTPATIENT)
Dept: OPHTHALMOLOGY | Facility: CLINIC | Age: 73
End: 2023-08-23
Payer: MEDICARE

## 2023-09-01 ENCOUNTER — TELEPHONE (OUTPATIENT)
Dept: OPHTHALMOLOGY | Facility: CLINIC | Age: 73
End: 2023-09-01
Payer: MEDICARE

## 2023-09-01 NOTE — TELEPHONE ENCOUNTER
Spoke with pt  wife provided arrival time of 7:00 for sx on 8/30/23 with Dr. Gorman @ [facility]. Pt has eyedrops and packet.

## 2023-09-05 ENCOUNTER — ANESTHESIA EVENT (OUTPATIENT)
Dept: SURGERY | Facility: HOSPITAL | Age: 73
End: 2023-09-05
Payer: MEDICARE

## 2023-09-05 RX ORDER — AMOXICILLIN 500 MG
CAPSULE ORAL DAILY
COMMUNITY

## 2023-09-05 NOTE — PRE-PROCEDURE INSTRUCTIONS
- Nothing to eat or drink after midinight, except AM meds with small sips of water, Gatorade/Powerade  - Hold all Diabetic meds AM of surgery  - Hold all Insulin AM of surgery  - Hold all Fluid pills AM of surgery  - Hold all non-insulin shots until after surgery (Ozempic, Mounjaro, Trulicity, Victoza, Byetta, Wegovy and Adlyxin) (up to 7 days prior)  - Take all B/P meds, except those that contain a fluid pill  - Hold all vits and herbal meds AM of surgery  - Use inhalers as needed and bring AM of surgery  - Take blood thinner meds AM of surgery  - Use eye drops as directed  - Shower and wash face with dial soap for 3 mins PM prior and AM of surgery  - No powder, lotions, creams, (makeup),  or jewelry    - Wear comfortable clothing (button up shirt)    (Patients ride may not leave while patient is in surgery)    -- 2nd floor surgery ctr at [facility] @ [address] [facility] [place] [address]       Pt voiced understanding

## 2023-09-05 NOTE — H&P
History    Chief complaint:  Painless progressive vision loss    Present Ilness/Diagnosis: Nuclear sclerotic Cataract    Past Medical History:  has a past medical history of Amblyopia, CAD (coronary artery disease), Cataract, Coronary stent, CVD (cerebrovascular disease) (3/2012), Diabetes mellitus, Hypertension, Myocardial infarct, and Stroke.    Family History/Social History: refer to chart    Allergies:   Review of patient's allergies indicates:   Allergen Reactions    No known drug allergies        Current Medications: No current facility-administered medications for this encounter.    Current Outpatient Medications:     aspirin (ECOTRIN) 81 MG EC tablet, Take 81 mg by mouth once daily., Disp: , Rfl:     atorvastatin (LIPITOR) 40 MG tablet, Take 1 tablet (40 mg total) by mouth once daily., Disp: 30 tablet, Rfl: 5    b complex vitamins tablet, Take 1 tablet by mouth once daily., Disp: , Rfl:     BIOTIN ORAL, Take by mouth., Disp: , Rfl:     clopidogrel (PLAVIX) 75 mg tablet, Take 75 mg by mouth once daily., Disp: , Rfl:     erythromycin (ROMYCIN) ophthalmic ointment, Place into both eyes 3 (three) times daily., Disp: 3.5 g, Rfl: 2    escitalopram oxalate (LEXAPRO) 20 MG tablet, TAKE 1 TABLET BY MOUTH ONCE DAILY, Disp: 30 tablet, Rfl: 0    gabapentin (NEURONTIN) 300 MG capsule, Take 300 mg by mouth 3 (three) times daily., Disp: , Rfl:     krill oil 500 mg Cap, Take by mouth., Disp: , Rfl:     linaCLOtide (LINZESS) 145 mcg Cap capsule, Take by mouth., Disp: , Rfl:     lisinopril (PRINIVIL,ZESTRIL) 5 MG tablet, TAKE ONE TABLET BY MOUTH ONCE DAILY, Disp: 90 tablet, Rfl: 1    metFORMIN (GLUCOPHAGE) 1000 MG tablet, Take 1,000 mg by mouth 2 (two) times daily., Disp: , Rfl:     metformin (GLUCOPHAGE) 500 MG tablet, Take 500 mg by mouth once daily., Disp: , Rfl:     metoprolol succinate (TOPROL-XL) 25 MG 24 hr tablet, Take 25 mg by mouth once daily., Disp: , Rfl:     metoprolol tartrate (LOPRESSOR) 25 MG tablet, Take 25  mg by mouth once daily., Disp: , Rfl:     multivitamin (THERAGRAN) per tablet, Take 1 tablet by mouth once daily., Disp: , Rfl:     omeprazole (PRILOSEC OTC) 20 MG tablet, No Sig Provided, Disp: , Rfl:     prednisolon/gatiflox/bromfenac (PREDNISOL ACE-GATIFLOX-BROMFEN) 1-0.5-0.075 % DrpS, Apply 1 drop to eye 3 (three) times daily., Disp: 5 mL, Rfl: 3    TRADJENTA 5 mg Tab tablet, TAKE ONE TABLET BY MOUTH ONCE DAILY, Disp: 30 tablet, Rfl: 0    UBIDECARENONE (COENZYME Q10 ORAL), Take by mouth., Disp: , Rfl:     vitamin D (VITAMIN D3) 1000 units Tab, Take 1,000 Units by mouth., Disp: , Rfl:     vitamin D 185 MG Tab, Take 185 mg by mouth once daily.  , Disp: , Rfl:     Physical Exam    BP: Vital signs stable  General: No apparent distress  HEENT: nuclear sclerotic cataract  Lungs: adequate respirations  Heart: + pulses  Abdomen: soft  Rectal/pelvic: deferred    Impression: Visually significant Cataract.    See previous clinic notes for surgical indications.    Plan: Phacoemulsification with implantation of Intraocular lens

## 2023-09-06 ENCOUNTER — HOSPITAL ENCOUNTER (OUTPATIENT)
Facility: HOSPITAL | Age: 73
Discharge: HOME | End: 2023-09-06
Attending: OPHTHALMOLOGY | Admitting: OPHTHALMOLOGY
Payer: MEDICARE

## 2023-09-06 ENCOUNTER — ANESTHESIA (OUTPATIENT)
Dept: SURGERY | Facility: HOSPITAL | Age: 73
End: 2023-09-06
Payer: MEDICARE

## 2023-09-06 VITALS
HEART RATE: 51 BPM | BODY MASS INDEX: 26.55 KG/M2 | SYSTOLIC BLOOD PRESSURE: 149 MMHG | DIASTOLIC BLOOD PRESSURE: 84 MMHG | HEIGHT: 72 IN | WEIGHT: 196 LBS | RESPIRATION RATE: 16 BRPM | TEMPERATURE: 97 F | OXYGEN SATURATION: 100 %

## 2023-09-06 DIAGNOSIS — H25.10 AGE-RELATED NUCLEAR CATARACT: ICD-10-CM

## 2023-09-06 DIAGNOSIS — H25.11 NUCLEAR SCLEROSIS OF RIGHT EYE: Primary | ICD-10-CM

## 2023-09-06 LAB
GLUCOSE SERPL-MCNC: 138 MG/DL (ref 70–110)
POCT GLUCOSE: 138 MG/DL (ref 70–110)

## 2023-09-06 PROCEDURE — 37000009 HC ANESTHESIA EA ADD 15 MINS: Performed by: OPHTHALMOLOGY

## 2023-09-06 PROCEDURE — 71000015 HC POSTOP RECOV 1ST HR: Performed by: OPHTHALMOLOGY

## 2023-09-06 PROCEDURE — 82962 GLUCOSE BLOOD TEST: CPT | Performed by: OPHTHALMOLOGY

## 2023-09-06 PROCEDURE — D9220A PRA ANESTHESIA: Mod: ,,, | Performed by: REGISTERED NURSE

## 2023-09-06 PROCEDURE — 36000707: Performed by: OPHTHALMOLOGY

## 2023-09-06 PROCEDURE — 66982 PR REMOVAL, CATARACT, W/INSRT INTRAOC LENS, W/O ENDO CYCLO, CMPLX: ICD-10-PCS | Mod: RT,,, | Performed by: OPHTHALMOLOGY

## 2023-09-06 PROCEDURE — 25000003 PHARM REV CODE 250: Performed by: OPHTHALMOLOGY

## 2023-09-06 PROCEDURE — 36000706: Performed by: OPHTHALMOLOGY

## 2023-09-06 PROCEDURE — 63600175 PHARM REV CODE 636 W HCPCS: Performed by: REGISTERED NURSE

## 2023-09-06 PROCEDURE — 37000008 HC ANESTHESIA 1ST 15 MINUTES: Performed by: OPHTHALMOLOGY

## 2023-09-06 PROCEDURE — 66982 XCAPSL CTRC RMVL CPLX WO ECP: CPT | Mod: RT,,, | Performed by: OPHTHALMOLOGY

## 2023-09-06 PROCEDURE — 66999 PR FEMTO TORIC: ICD-10-PCS | Mod: CSM,RT,, | Performed by: OPHTHALMOLOGY

## 2023-09-06 PROCEDURE — 94761 N-INVAS EAR/PLS OXIMETRY MLT: CPT

## 2023-09-06 PROCEDURE — D9220A PRA ANESTHESIA: ICD-10-PCS | Mod: ,,, | Performed by: REGISTERED NURSE

## 2023-09-06 PROCEDURE — V2787 ASTIGMATISM-CORRECT FUNCTION: HCPCS | Performed by: OPHTHALMOLOGY

## 2023-09-06 PROCEDURE — 99900035 HC TECH TIME PER 15 MIN (STAT)

## 2023-09-06 PROCEDURE — 66999 UNLISTED PX ANT SEGMENT EYE: CPT | Mod: CSM,RT,, | Performed by: OPHTHALMOLOGY

## 2023-09-06 RX ORDER — TETRACAINE HYDROCHLORIDE 5 MG/ML
SOLUTION OPHTHALMIC
Status: DISCONTINUED | OUTPATIENT
Start: 2023-09-06 | End: 2023-09-06 | Stop reason: HOSPADM

## 2023-09-06 RX ORDER — ONDANSETRON 2 MG/ML
4 INJECTION INTRAMUSCULAR; INTRAVENOUS DAILY PRN
Status: DISCONTINUED | OUTPATIENT
Start: 2023-09-06 | End: 2023-09-06 | Stop reason: HOSPADM

## 2023-09-06 RX ORDER — MOXIFLOXACIN 5 MG/ML
SOLUTION/ DROPS OPHTHALMIC
Status: DISCONTINUED | OUTPATIENT
Start: 2023-09-06 | End: 2023-09-06 | Stop reason: HOSPADM

## 2023-09-06 RX ORDER — PROCHLORPERAZINE EDISYLATE 5 MG/ML
5 INJECTION INTRAMUSCULAR; INTRAVENOUS EVERY 30 MIN PRN
Status: DISCONTINUED | OUTPATIENT
Start: 2023-09-06 | End: 2023-09-06 | Stop reason: HOSPADM

## 2023-09-06 RX ORDER — PREDNISOLONE ACETATE 10 MG/ML
SUSPENSION/ DROPS OPHTHALMIC
Status: DISCONTINUED | OUTPATIENT
Start: 2023-09-06 | End: 2023-09-06 | Stop reason: HOSPADM

## 2023-09-06 RX ORDER — PHENYLEPHRINE HYDROCHLORIDE 100 MG/ML
1 SOLUTION/ DROPS OPHTHALMIC
Status: DISCONTINUED | OUTPATIENT
Start: 2023-09-06 | End: 2023-09-06 | Stop reason: HOSPADM

## 2023-09-06 RX ORDER — TETRACAINE HYDROCHLORIDE 5 MG/ML
1 SOLUTION OPHTHALMIC
Status: DISCONTINUED | OUTPATIENT
Start: 2023-09-06 | End: 2023-09-06 | Stop reason: HOSPADM

## 2023-09-06 RX ORDER — OXYCODONE AND ACETAMINOPHEN 5; 325 MG/1; MG/1
1 TABLET ORAL
Status: DISCONTINUED | OUTPATIENT
Start: 2023-09-06 | End: 2023-09-06 | Stop reason: HOSPADM

## 2023-09-06 RX ORDER — LIDOCAINE HYDROCHLORIDE 10 MG/ML
INJECTION, SOLUTION EPIDURAL; INFILTRATION; INTRACAUDAL; PERINEURAL
Status: DISCONTINUED | OUTPATIENT
Start: 2023-09-06 | End: 2023-09-06 | Stop reason: HOSPADM

## 2023-09-06 RX ORDER — MEPERIDINE HYDROCHLORIDE 50 MG/ML
12.5 INJECTION INTRAMUSCULAR; INTRAVENOUS; SUBCUTANEOUS ONCE AS NEEDED
Status: DISCONTINUED | OUTPATIENT
Start: 2023-09-06 | End: 2023-09-06 | Stop reason: HOSPADM

## 2023-09-06 RX ORDER — LIDOCAINE HYDROCHLORIDE 40 MG/ML
INJECTION, SOLUTION RETROBULBAR
Status: DISCONTINUED | OUTPATIENT
Start: 2023-09-06 | End: 2023-09-06 | Stop reason: HOSPADM

## 2023-09-06 RX ORDER — PROPARACAINE HYDROCHLORIDE 5 MG/ML
1 SOLUTION/ DROPS OPHTHALMIC
Status: DISCONTINUED | OUTPATIENT
Start: 2023-09-06 | End: 2023-09-06 | Stop reason: HOSPADM

## 2023-09-06 RX ORDER — MIDAZOLAM HYDROCHLORIDE 1 MG/ML
INJECTION, SOLUTION INTRAMUSCULAR; INTRAVENOUS
Status: DISCONTINUED | OUTPATIENT
Start: 2023-09-06 | End: 2023-09-06

## 2023-09-06 RX ORDER — PROPARACAINE HYDROCHLORIDE 5 MG/ML
1 SOLUTION/ DROPS OPHTHALMIC DAILY PRN
Status: DISCONTINUED | OUTPATIENT
Start: 2023-09-06 | End: 2023-09-06 | Stop reason: HOSPADM

## 2023-09-06 RX ORDER — CYCLOP/TROP/PROPA/PHEN/KET/WAT 1-1-0.1%
1 DROPS (EA) OPHTHALMIC (EYE)
Status: COMPLETED | OUTPATIENT
Start: 2023-09-06 | End: 2023-09-06

## 2023-09-06 RX ORDER — ACETAMINOPHEN 325 MG/1
650 TABLET ORAL EVERY 4 HOURS PRN
Status: DISCONTINUED | OUTPATIENT
Start: 2023-09-06 | End: 2023-09-06 | Stop reason: HOSPADM

## 2023-09-06 RX ORDER — SODIUM CHLORIDE 0.9 % (FLUSH) 0.9 %
2 SYRINGE (ML) INJECTION
Status: DISCONTINUED | OUTPATIENT
Start: 2023-09-06 | End: 2023-09-06 | Stop reason: HOSPADM

## 2023-09-06 RX ORDER — MOXIFLOXACIN 5 MG/ML
1 SOLUTION/ DROPS OPHTHALMIC
Status: COMPLETED | OUTPATIENT
Start: 2023-09-06 | End: 2023-09-06

## 2023-09-06 RX ADMIN — MOXIFLOXACIN OPHTHALMIC 1 DROP: 5 SOLUTION/ DROPS OPHTHALMIC at 07:09

## 2023-09-06 RX ADMIN — Medication 1 DROP: at 07:09

## 2023-09-06 RX ADMIN — MOXIFLOXACIN 1 DROP: 5 SOLUTION/ DROPS OPHTHALMIC at 09:09

## 2023-09-06 RX ADMIN — MIDAZOLAM HYDROCHLORIDE 2 MG: 1 INJECTION, SOLUTION INTRAMUSCULAR; INTRAVENOUS at 08:09

## 2023-09-06 NOTE — ANESTHESIA PREPROCEDURE EVALUATION
09/05/2023  Cyrus cEkert is a 72 y.o., male for elective right IOL / Cataract    Hx of anesthetics in past without complications  NPO>8 hours  No food or drug allergies  Amenable to proceed with scheduled procedure under Local / sedation       Patient Active Problem List   Diagnosis    CAD (coronary artery disease)    S/P coronary artery stent placement    Hypertension    CVD (cerebrovascular disease)    Type 2 diabetes mellitus with peripheral vascular disease    Stroke    Combined fat and carbohydrate induced hyperlipemia    Nuclear sclerosis    PSC (posterior subcapsular cataract), bilateral       Past Medical History:   Diagnosis Date    Amblyopia     OD    CAD (coronary artery disease)     Cataract     OD    Coronary stent     multivessel PCI    CVD (cerebrovascular disease) 3/2012    stroke    Diabetes mellitus     Hypertension     Myocardial infarct     Stroke     right sided weakness residual       ECHO: No results found for this or any previous visit.      There is no height or weight on file to calculate BMI.    Tobacco Use: Low Risk  (5/22/2023)    Patient History     Smoking Tobacco Use: Never     Smokeless Tobacco Use: Never     Passive Exposure: Not on file       Social History     Substance and Sexual Activity   Drug Use No        Alcohol Use: Not on file       Review of patient's allergies indicates:   Allergen Reactions    No known drug allergies          Airway:  No value filed.         Pre-op Assessment    I have reviewed the Patient Summary Reports.     I have reviewed the Nursing Notes. I have reviewed the NPO Status.   I have reviewed the Medications.     Review of Systems  Anesthesia Hx:  No problems with previous Anesthesia  History of prior surgery of interest to airway management or planning: Denies Family Hx of Anesthesia complications.   Denies Personal Hx of  Anesthesia complications.   Cardiovascular:   Hypertension Past MI CAD  CABG/stent  PVD hyperlipidemia  Coronary Artery Disease: S/P Percutaneous Coronary Intervention (PCI) Hx of Myocardial Infarction  Peripheral Arterial Disease  Hypertension    Neurological:   CVA    Endocrine:   Diabetes        Physical Exam  General: Flat Affect, Well nourished, Alert, Cooperative and Oriented    Airway:  Mallampati: II / II  Mouth Opening: Normal  TM Distance: Normal  Neck ROM: Normal ROM    Dental:  Periodontal disease        Anesthesia Plan  Type of Anesthesia, risks & benefits discussed:    Anesthesia Type: MAC  Intra-op Monitoring Plan: Standard ASA Monitors  Post Op Pain Control Plan: multimodal analgesia and IV/PO Opioids PRN  Induction:  IV  Informed Consent: Informed consent signed with the Patient and all parties understand the risks and agree with anesthesia plan.  All questions answered. Patient consented to blood products? No  ASA Score: 3    Ready For Surgery From Anesthesia Perspective.     .

## 2023-09-06 NOTE — ANESTHESIA POSTPROCEDURE EVALUATION
Anesthesia Post Evaluation    Patient: Cyrus Eckert    Procedure(s) Performed: Procedure(s) (LRB):  EXTRACTION, CATARACT, WITH IOL INSERTION (Right)    Final Anesthesia Type: MAC      Patient location during evaluation: PACU  Patient participation: Yes- Able to Participate  Level of consciousness: awake and alert and oriented  Post-procedure vital signs: reviewed and stable  Pain management: adequate  Airway patency: patent    PONV status at discharge: No PONV  Anesthetic complications: no      Cardiovascular status: hemodynamically stable  Respiratory status: unassisted  Hydration status: euvolemic  Follow-up not needed.          Vitals Value Taken Time   /80 09/06/23 0911   Temp 36.2 °C (97.2 °F) 09/06/23 0905   Pulse 52 09/06/23 0913   Resp 16 09/06/23 0905   SpO2 100 % 09/06/23 0913   Vitals shown include unvalidated device data.      No case tracking events are documented in the log.      Pain/Anish Score: Anish Score: 10 (9/6/2023  9:05 AM)

## 2023-09-06 NOTE — DISCHARGE SUMMARY
[facility]  Discharge Note  Short Stay    Procedure(s) (LRB):  EXTRACTION, CATARACT, WITH IOL INSERTION (Right)    BRIEF DISCHARGE NOTE:    Date of discharge: 09/06/2023    Reason for hospitalization -  Cataract surgery     Final Diagnosis - Visually significant Cataract    Procedures and treatment provided - Status post phacoemulsification with placement of intraocular lens     Diet - Advance to regular as tolerated    Activity - as tolerated    Disposition at the end of the case - Good.    Discharge: to home    The patient tolerated the procedure well and knows to follow up with me tomorrow morning in the eye clinic, sooner if needed.    Patient and family instructions (as appropriate) - Given to patient on discharge    Yumiko Gorman MD

## 2023-09-06 NOTE — DISCHARGE INSTRUCTIONS
Dr. Gorman     Cataract Post-Operative Instructions       Day of surgery:     -Resume drops THREE times daily into the operative eye.     -Do not rub your eye     -Wear protective sunglasses during the day.     -Resume moderate activity.     -Bathe/shower/wash face normally     -Do not apply makeup around the operative eye for 1 week.     -You should expect:     Blurry vision and halos for 24-48 hours     Dilated pupil for 24-48 hours     Scratchy feeling in the eye for 1-2 days     Curved shadow in your peripheral vision for 2-3 weeks     Occasional flickering of lights for up to 1 week     -If you experience severe pain or nausea, call Dr. Gorman or the on-call doctor at [phone].       Plan to see Dr. Gorman tomorrow at:     [facility] [address]     10TH FLOOR     [facility] [address]     **Most patients can drive the next morning.  If you do not feel comfortable driving, please arrange for transportation. **

## 2023-09-06 NOTE — TRANSFER OF CARE
Anesthesia Transfer of Care Note    Patient: Cyrus Eckert    Procedure(s) Performed: Procedure(s) (LRB):  EXTRACTION, CATARACT, WITH IOL INSERTION (Right)    Patient location: PACU    Anesthesia Type: MAC    Transport from OR: Transported from OR on room air with adequate spontaneous ventilation    Post pain: adequate analgesia    Post assessment: no apparent anesthetic complications and tolerated procedure well    Post vital signs: stable    Level of consciousness: awake    Nausea/Vomiting: no nausea/vomiting    Complications: none    Transfer of care protocol was followed      Last vitals:   Visit Vitals  BP (!) 156/77 (BP Location: Left arm, Patient Position: Lying)   Pulse (!) 53   Temp 36.5 °C (97.7 °F) (Oral)   Resp 16   Ht 6' (1.829 m)   Wt 88.9 kg (196 lb)   SpO2 98%   BMI 26.58 kg/m²

## 2023-09-06 NOTE — OP NOTE
DATE OF PROCEDURE: 09/06/2023    SURGEON: TRVA BELTRAN MD    PREOPERATIVE DIAGNOSIS:  Mature brunescent senile nuclear sclerotic cataract right eye.     POSTOPERATIVE DIAGNOSIS: Mature brunescent senile nuclear sclerotic cataract right eye.     PROCEDURE PERFORMED:  Complex phacoemulsification with placement of TORIC intraocular lens, right eye, with trypan blue       IMPLANT:  [id number] POWER 21.5    ANESTHESIA:  Topical and MAC    COMPLICATIONS: none    ESTIMATED BLOOD LOSS: <1cc    SPECIMENS: none    INDICATIONS FOR PROCEDURE:   The patient has a history of painless progressive vision loss.  The patient has described difficulties with activities of daily living, which specifically include driving, which is secondary to cataract formation and progression. After we had a thorough discussion about risks, benefits, and alternatives to cataract surgery, the patient agreed to proceed with phacoemulsification and implantation of a lens in the right eye.  These risks include, but are not limited to, hemorrhage, pain, infection, need for additional surgery, need for glasses or contacts, loss of vision, or even loss of the eye.    PROCEDURE IN DETAIL:  The patient was met in the preop holding area.  Consent was confirmed to be signed.  The operative site was marked.  The patient was brought into the operating room by the anesthesia team and placed under monitored anesthesia care.  The right eye was prepped and draped in a sterile ophthalmic fashion.  A Ankit speculum was placed into the right eye.   A paracentesis site was made and 1% preservative-free lidocaine was injected into the anterior chamber.  Trypan blue was then injected and allowed to sit for 1 minute.  Then BSS was used to wash out the trypan blue. Viscoelastic material was injected into the anterior chamber.  A keratome blade was used to make a clear corneal incision.  A cystotome was used to initiate the continuous curvilinear capsulorrhexis which was  completed with Utrata forceps.  BSS on a tillman cannula was used to perform hydrodissection.  The phacoemulsification tip was introduced into the eye and the nucleus was removed in a standard divide-and-conquer fashion.  Remaining cortical material was removed from the eye using irrigation-aspiration.  The capsular bag was filled with viscoelastic material and the intraocular lens was injected and positioned into place. Remaining viscoelastic material was removed from the eye using irrigation and aspiration.  The corneal wounds were hydrated.  The eye was filled to physiologic pressure. The wounds were found to be watertight. Drops of Vigamox and prednisilone were placed into the eye.  The eye was washed, dried, and shielded.  The patient tolerated the procedure well and knows to follow up with me tomorrow morning, sooner if needed.    Intraoperative aberrometer (ORA) was used to confirm calculations.

## 2023-09-07 ENCOUNTER — OFFICE VISIT (OUTPATIENT)
Dept: OPHTHALMOLOGY | Facility: CLINIC | Age: 73
End: 2023-09-07
Payer: MEDICARE

## 2023-09-07 DIAGNOSIS — Z98.41 STATUS POST CATARACT EXTRACTION AND INSERTION OF INTRAOCULAR LENS, RIGHT: Primary | ICD-10-CM

## 2023-09-07 DIAGNOSIS — Z96.1 STATUS POST CATARACT EXTRACTION AND INSERTION OF INTRAOCULAR LENS, RIGHT: Primary | ICD-10-CM

## 2023-09-07 PROCEDURE — 99024 POSTOP FOLLOW-UP VISIT: CPT | Mod: POP,,, | Performed by: OPHTHALMOLOGY

## 2023-09-07 PROCEDURE — 99024 PR POST-OP FOLLOW-UP VISIT: ICD-10-PCS | Mod: POP,,, | Performed by: OPHTHALMOLOGY

## 2023-09-07 PROCEDURE — 99999 PR PBB SHADOW E&M-EST. PATIENT-LVL II: CPT | Mod: PBBFAC,,, | Performed by: OPHTHALMOLOGY

## 2023-09-07 PROCEDURE — 99212 OFFICE O/P EST SF 10 MIN: CPT | Mod: PBBFAC | Performed by: OPHTHALMOLOGY

## 2023-09-07 PROCEDURE — 99999 PR PBB SHADOW E&M-EST. PATIENT-LVL II: ICD-10-PCS | Mod: PBBFAC,,, | Performed by: OPHTHALMOLOGY

## 2023-09-07 NOTE — PROGRESS NOTES
HPI    Dr. Lange    Diabetes mellitus type 2 without retinopathy  Refractive amblyopia, right  Cataract associated with type 2 diabetes mellitus  Posterior vitreous detachment, left  Pseudophakia, left eye    71 y/o male present to clinic for 1 day post-op of RT eye. Pt states s/x   went well. Denies pain and discomfort.     Eyemeds  PGB TID OD  Last edited by Janna Hightower on 9/7/2023  2:18 PM.            Assessment /Plan     For exam results, see Encounter Report.    Status post cataract extraction and insertion of intraocular lens, right      Slit Lamp Exam  L/L - normal  C/s - quiet  Cornea - clear  A/C - 1+ cell  Lens - PCIOL    POD #1 s/p phaco/IOL  - doing well  - continue the following drops:    vigamox or ocuflox TID x 1 wk then stop  Pred forte TID x  4 wks  Ketorolac TID until runs out    Versus:    Combination drop - 1 drop TID x total of 1 month    Appropriate precautions and post op medications reviewed.  Patient instructed to call or come in if symptoms of redness, decreased vision, or pain are experienced.    -f/up 1-2 wks, sooner PRN. Or 4 wks with optom for mrx if needed.      Pciol OS

## 2023-09-07 NOTE — MR AVS SNAPSHOT
[facility]  [address] [facility] [address]  [facility] [address]  Phone: [phone]  Fax: [phone]                  Cyrus Eckert   2017 1:15 PM   Office Visit    Description:  Male : 1950   Provider:  Guera Lui MD   Department:  [facility]           Reason for Visit     Post-op Evaluation           Diagnoses this Visit        Comments    Post-operative state    -  Primary     Nuclear sclerosis, bilateral         Diabetes mellitus type 2 without retinopathy         Refractive amblyopia, right         Refractive error                To Do List           Goals (5 Years of Data)     None      Follow-Up and Disposition     Return in about 1 year (around 2018) for f/u cataract OD - Dr. Lange.      [facility]     [facility] Care [facility] -  Assistance  Unless otherwise directed by your provider, please contact [facility], our nurse care line that is available for  assistance.     Registered nurses in the [facility] provide: appointment scheduling, clinical advisement, health education, and other advisory services.  Call: [phone] (toll free)               Medications           Message regarding Medications     Verify the changes and/or additions to your medication regime listed below are the same as discussed with your clinician today.  If any of these changes or additions are incorrect, please notify your healthcare provider.        STOP taking these medications     ketorolac 0.5% (ACULAR) 0.5 % Drop Place 1 drop into the left eye 4 (four) times daily. Start 1 week before surgery.    moxifloxacin (VIGAMOX) 0.5 % ophthalmic solution Place 1 drop into the left eye 4 (four) times daily. Start 1 day before cataract surgery.    prednisoLONE acetate (PRED FORTE) 1 % DrpS Place 1 drop into the left eye 4 (four) times daily. Start on day of surgery.           Verify that the below list of medications is an accurate  2 seconds or less representation of the medications you are currently taking.  If none reported, the list may be blank. If incorrect, please contact your healthcare provider. Carry this list with you in case of emergency.           Current Medications     aspirin (ECOTRIN) 81 MG EC tablet Take 81 mg by mouth once daily.    atorvastatin (LIPITOR) 40 MG tablet Take 1 tablet (40 mg total) by mouth once daily.    b complex vitamins tablet Take 1 tablet by mouth once daily.    BIOTIN ORAL Take by mouth.    clopidogrel (PLAVIX) 75 mg tablet Take 75 mg by mouth once daily.    escitalopram oxalate (LEXAPRO) 20 MG tablet TAKE ONE TABLET BY MOUTH ONCE DAILY    escitalopram oxalate (LEXAPRO) 20 MG tablet TAKE ONE TABLET BY MOUTH ONCE DAILY    lisinopril (PRINIVIL,ZESTRIL) 5 MG tablet TAKE ONE TABLET BY MOUTH ONCE DAILY    metformin (GLUCOPHAGE) 500 MG tablet Take 500 mg by mouth once daily.    metoprolol succinate (TOPROL-XL) 25 MG 24 hr tablet Take 25 mg by mouth once daily.    multivitamin (ONE DAILY MULTIVITAMIN) per tablet Take 1 tablet by mouth once daily.    omeprazole (PRILOSEC OTC) 20 MG tablet No Sig Provided    TRADJENTA 5 mg Tab tablet TAKE ONE TABLET BY MOUTH ONCE DAILY    TRADJENTA 5 mg Tab tablet TAKE ONE TABLET BY MOUTH ONCE DAILY    UBIDECARENONE (COENZYME Q10 ORAL) Take by mouth.    vitamin D 185 MG Tab Take 185 mg by mouth once daily.             Clinical Reference Information           Allergies as of 4/11/2017     No Known Drug Allergies      Immunizations Administered on Date of Encounter - 4/11/2017     None      Language Assistance Services     ATTENTION: Language assistance services are available, free of charge. Please call [phone].      ATENCIÓN: Si habla español, tiene a allen disposición servicios gratuitos de asistencia lingüística. Llame al [phone].     [facility] Ý: N?u b?n nói Ti?ng Vi?t, có các d?ch v? h? tr? ngôn ng? mi?n phí dành cho b?n. G?i s? [phone].         [facility] complies  with applicable Federal civil rights laws and does not discriminate on the basis of race, color, national origin, age, disability, or sex.

## 2023-09-15 ENCOUNTER — OFFICE VISIT (OUTPATIENT)
Dept: OPHTHALMOLOGY | Facility: CLINIC | Age: 73
End: 2023-09-15
Payer: MEDICARE

## 2023-09-15 DIAGNOSIS — Z98.41 STATUS POST CATARACT EXTRACTION AND INSERTION OF INTRAOCULAR LENS, RIGHT: Primary | ICD-10-CM

## 2023-09-15 DIAGNOSIS — Z98.42 STATUS POST CATARACT EXTRACTION AND INSERTION OF INTRAOCULAR LENS, LEFT: ICD-10-CM

## 2023-09-15 DIAGNOSIS — Z96.1 STATUS POST CATARACT EXTRACTION AND INSERTION OF INTRAOCULAR LENS, RIGHT: Primary | ICD-10-CM

## 2023-09-15 DIAGNOSIS — Z96.1 STATUS POST CATARACT EXTRACTION AND INSERTION OF INTRAOCULAR LENS, LEFT: ICD-10-CM

## 2023-09-15 PROCEDURE — 99024 PR POST-OP FOLLOW-UP VISIT: ICD-10-PCS | Mod: POP,,, | Performed by: OPHTHALMOLOGY

## 2023-09-15 PROCEDURE — 99212 OFFICE O/P EST SF 10 MIN: CPT | Mod: PBBFAC | Performed by: OPHTHALMOLOGY

## 2023-09-15 PROCEDURE — 99024 POSTOP FOLLOW-UP VISIT: CPT | Mod: POP,,, | Performed by: OPHTHALMOLOGY

## 2023-09-15 PROCEDURE — 99999 PR PBB SHADOW E&M-EST. PATIENT-LVL II: ICD-10-PCS | Mod: PBBFAC,,, | Performed by: OPHTHALMOLOGY

## 2023-09-15 PROCEDURE — 99999 PR PBB SHADOW E&M-EST. PATIENT-LVL II: CPT | Mod: PBBFAC,,, | Performed by: OPHTHALMOLOGY

## 2023-09-15 NOTE — PROGRESS NOTES
HPI    Dr. Lange    S/p Complex phacoemulsification with placement of TORIC intraocular lens,   right eye, with trypan blue 09/06/2023  Diabetes mellitus type 2 without retinopathy  Refractive amblyopia, right  Cataract associated with type 2 diabetes mellitus  Posterior vitreous detachment, left  Pseudophakia, left eye    Eyemeds  PGB TID OD    Patient is here today for 1 week post op.  Pt. States vision has improved.  Pt. Denies pain or discomfort.  Last edited by Maria Guadalupe Benjamin on 9/15/2023  2:08 PM.            Assessment /Plan     For exam results, see Encounter Report.    Status post cataract extraction and insertion of intraocular lens, right    Status post cataract extraction and insertion of intraocular lens, left      PO week #1 s/p phaco/IOL -    - doing well, no issues    Continue combo drops for a total of 1 month versus: d/c abx gtt, continue PF/ketorolocTID for total of 1 month    - f/up 3-4 wks for MRx, DFE with optom, sooner PRN.

## 2023-10-16 ENCOUNTER — OFFICE VISIT (OUTPATIENT)
Dept: OPTOMETRY | Facility: CLINIC | Age: 73
End: 2023-10-16
Payer: MEDICARE

## 2023-10-16 DIAGNOSIS — Z98.41 STATUS POST CATARACT EXTRACTION AND INSERTION OF INTRAOCULAR LENS, RIGHT: Primary | ICD-10-CM

## 2023-10-16 DIAGNOSIS — H52.7 REFRACTIVE ERROR: ICD-10-CM

## 2023-10-16 DIAGNOSIS — Z96.1 STATUS POST CATARACT EXTRACTION AND INSERTION OF INTRAOCULAR LENS, LEFT: ICD-10-CM

## 2023-10-16 DIAGNOSIS — Z98.42 STATUS POST CATARACT EXTRACTION AND INSERTION OF INTRAOCULAR LENS, LEFT: ICD-10-CM

## 2023-10-16 DIAGNOSIS — H53.021 REFRACTIVE AMBLYOPIA, RIGHT: ICD-10-CM

## 2023-10-16 DIAGNOSIS — Z96.1 STATUS POST CATARACT EXTRACTION AND INSERTION OF INTRAOCULAR LENS, RIGHT: Primary | ICD-10-CM

## 2023-10-16 PROCEDURE — 99999 PR PBB SHADOW E&M-EST. PATIENT-LVL III: CPT | Mod: PBBFAC,,,

## 2023-10-16 PROCEDURE — 99213 OFFICE O/P EST LOW 20 MIN: CPT | Mod: PBBFAC,PO

## 2023-10-16 PROCEDURE — 99999 PR PBB SHADOW E&M-EST. PATIENT-LVL III: ICD-10-PCS | Mod: PBBFAC,,,

## 2023-10-16 PROCEDURE — 99024 PR POST-OP FOLLOW-UP VISIT: ICD-10-PCS | Mod: POP,,,

## 2023-10-16 PROCEDURE — 99024 POSTOP FOLLOW-UP VISIT: CPT | Mod: POP,,,

## 2023-10-16 NOTE — PROGRESS NOTES
HPI    Pt here for 1 month post op OD dls- 09/15/23    Pt states his vision has been good, using +2.50 readers.   Pt denies F/F and GTTS.     Last edited by Kaylan Hyatt on 10/16/2023  8:26 AM.            Assessment /Plan     For exam results, see Encounter Report.    Status post cataract extraction and insertion of intraocular lens, right    Status post cataract extraction and insertion of intraocular lens, left    Refractive amblyopia, right    Refractive error      1-2. Pt presented for one month post-op OD s/p CE w/IOL 09/06/23. Pt had OS CE a few years ago. Pt doing well and completed all drops as instructed. No signs of anterior or posterior segment inflammation OD, OS. Pt denies any irritation or pain. PCIOL centered and clear. Pt satisfied with vision. Ed pt to RTC if any symptoms arise. Otherwise, monitor yearly.    3-4. Longstanding history of refractive amblyopia OD. Pt does not desire to wear spec correction, ok to continue with OTC readers for near work. No spec rx given today.      RTC: 1 year for comprehensive exam or sooner prn

## 2025-02-03 ENCOUNTER — OFFICE VISIT (OUTPATIENT)
Dept: NEUROLOGY | Facility: CLINIC | Age: 75
End: 2025-02-03
Payer: MEDICARE

## 2025-02-03 VITALS
SYSTOLIC BLOOD PRESSURE: 120 MMHG | DIASTOLIC BLOOD PRESSURE: 71 MMHG | BODY MASS INDEX: 27.14 KG/M2 | HEIGHT: 72 IN | RESPIRATION RATE: 14 BRPM | HEART RATE: 69 BPM | WEIGHT: 200.38 LBS

## 2025-02-03 DIAGNOSIS — R20.2 ARM PARESTHESIA, LEFT: Primary | ICD-10-CM

## 2025-02-03 DIAGNOSIS — I73.9 BILATERAL CLAUDICATION OF LOWER LIMB: ICD-10-CM

## 2025-02-03 DIAGNOSIS — G56.02 LEFT CARPAL TUNNEL SYNDROME: ICD-10-CM

## 2025-02-03 DIAGNOSIS — G47.10 HYPERSOMNIA: ICD-10-CM

## 2025-02-03 DIAGNOSIS — I63.9 CEREBROVASCULAR ACCIDENT (CVA), UNSPECIFIED MECHANISM: ICD-10-CM

## 2025-02-03 PROCEDURE — 99999 PR PBB SHADOW E&M-EST. PATIENT-LVL III: CPT | Mod: PBBFAC,,, | Performed by: NURSE PRACTITIONER

## 2025-02-03 PROCEDURE — 99213 OFFICE O/P EST LOW 20 MIN: CPT | Mod: PBBFAC,PO | Performed by: NURSE PRACTITIONER

## 2025-02-03 PROCEDURE — 99204 OFFICE O/P NEW MOD 45 MIN: CPT | Mod: S$PBB,,, | Performed by: NURSE PRACTITIONER

## 2025-02-03 RX ORDER — NIFEDIPINE 60 MG/1
30 TABLET, EXTENDED RELEASE ORAL DAILY
COMMUNITY

## 2025-02-03 RX ORDER — GLIMEPIRIDE 2 MG/1
2 TABLET ORAL
COMMUNITY

## 2025-02-03 NOTE — PATIENT INSTRUCTIONS
We will request your blood work from Dr. Gar to ensure that we don't need to add any additional testing.     We will obtain a nerve conduction test on the L arm to find out what the issue is that could be causing the tingling. It sounds like carpal tunnel syndrome. For now, I want you to start using a nightly wrist brace on the L to see if this helps --- may take several weeks.     I will also order a scan of the arteries in your legs to see if there are any blockages to explain the leg symptoms.     Follow up with me in 6-8 weeks (after the EMG is done)    Please call our clinic at [phone] or send a message on the [employer] portal if there are any changes to the plan discussed today. For example, if you are not contacted for the requested tests, referral(s) within one week, if you are unable to receive the medications prescribed, or if you feel you need to change the treatment course for any reason.

## 2025-02-03 NOTE — ASSESSMENT & PLAN NOTE
On max med therapy for vascular RF  Check OZZY / arterial US BLE   Sensations not c/w peripheral neuropathy and no e/o neuropathy on exam

## 2025-02-03 NOTE — PROGRESS NOTES
"NEUROLOGY  Outpatient Consultation Visit     [facility]  [address], [place] [address]  [phone] (office) / [phone] (fax)    Patient Name:  Cyrus Eckert  :  1950  MR #:  [id number]  Acct #:  [id number]    Date of  Visit: 2025    Other Physicians:  Christina Gar MD (Primary Care Physician)      CHIEF COMPLAINT: Numbness (And tingling in hands and legs / new patient )        HISTORY OF PRESENT ILLNESS:  Cyrus Eckert is a 74 y.o. R-handed male seen in consultation for paresthesias per Self, Aaareferral    Medical history is significant for CVA in 2012, CAD w/stents, DM2, HTN, HLD, aortic stenosis    Tinging and numbness in L median distributions. Wakes him from sleep, painful. Shaking his hand doesn't help. No worsening of dexterity of weakness. Has tried Tylenol with minimal relief.     Denies neck pain, radicular pains.     Similar sensation in bilateral posterior calves, but more like a squeezing or tightness. None in feet. Bothers him when kneeling to standing during mass and also while he walks. Helps to elevate the legs.     No change in gait since stroke. Chronic R sciatica, unchanged.     Onset of both sensations ~ 6 months ago. Has not had any evaluation yet.     CVA in  with residual dysarthria and RSW. Drags the R foot since. Not sure of cause, saw [facility] neurology in the past. Maintained on DAPT, statin.     Established with [facility]. No known PVD per pt. Hasn't mentioned these symptoms.     Recent ED visit to  with possible syncope. Was in [place] at Dr. Barros's office and "passed out" vs fell asleep per wife. She had trouble waking him up. He often falls asleep during the day. Never sleep tested. Wakes often from sleep. Snores.     Allergies:  Review of patient's allergies indicates:   Allergen Reactions    No known drug allergies        Current Medications:  Current Outpatient Medications   Medication Sig Dispense Refill    aspirin (ECOTRIN) 81 MG " EC tablet Take 81 mg by mouth once daily.      atorvastatin (LIPITOR) 40 MG tablet Take 1 tablet (40 mg total) by mouth once daily. 30 tablet 5    clopidogrel (PLAVIX) 75 mg tablet Take 75 mg by mouth once daily.      glimepiride (AMARYL) 2 MG tablet Take 2 mg by mouth before breakfast.      KRILL OIL ORAL Take by mouth.      metFORMIN (GLUCOPHAGE) 1000 MG tablet Take 1,000 mg by mouth 2 (two) times daily.      metoprolol tartrate (LOPRESSOR) 25 MG tablet Take 50 mg by mouth once daily.      NIFEdipine (ADALAT CC) 60 MG TbSR Take 30 mg by mouth once daily.      omega-3 fatty acids/fish oil (FISH OIL-OMEGA-3 FATTY ACIDS) 300-1,000 mg capsule Take by mouth once daily.      vitamin D (VITAMIN D3) 1000 units Tab Take 2,000 Units by mouth.       No current facility-administered medications for this visit.       Past Medical History:  Past Medical History:   Diagnosis Date    Amblyopia     OD    CAD (coronary artery disease)     Cataract     OD    Coronary stent     multivessel PCI    CVD (cerebrovascular disease) 03/2012    stroke    Diabetes mellitus     Hypertension     Myocardial infarct     Stroke     right sided weakness residual       Past Surgical History:  Past Surgical History:   Procedure Laterality Date    CATARACT EXTRACTION W/  INTRAOCULAR LENS IMPLANT Left 03/08/2017    Dr Lui    CATARACT EXTRACTION W/  INTRAOCULAR LENS IMPLANT Right 9/6/2023    Procedure: EXTRACTION, CATARACT, WITH IOL INSERTION;  Surgeon: Yumiko Gorman MD;  Location: [facility];  Service: Ophthalmology;  Laterality: Right;    CORONARY ANGIOPLASTY WITH STENT PLACEMENT         Family History:  family history is not on file.    Social History:   reports that he has never smoked. He has never used smokeless tobacco. He reports current alcohol use. He reports that he does not use drugs.      REVIEW OF SYSTEMS:  As per HPI    PHYSICAL EXAM:  /71 (BP Location: Right arm, Patient Position: Sitting)   Pulse 69   Resp 14   Ht 6'  (1.829 m)   Wt 90.9 kg (200 lb 6.4 oz)   BMI 27.18 kg/m²     General: Well groomed. No acute distress.  Pulmonary: Normal effort and rate.   Musculoskeletal: No obvious joint deformities, moves all extremities well.  Extremities: No clubbing, cyanosis or edema.     Neurological Exam  Mental Status  Awake and alert. Oriented to person, place, time and situation. Mild dysarthria present. Expressive aphasia present. Mild. Fund of knowledge is appropriate for level of education.    Cranial Nerves  CN II: Right visual acuity: Finger movement. Left visual acuity: Finger movement. Visual fields full to confrontation.  CN III, IV, VI: Extraocular movements intact bilaterally. Normal lids and orbits bilaterally. Pupils equal round and reactive to light bilaterally.  CN V: Facial sensation is normal.  CN VII:  Right: There is central facial weakness. Mild.  CN VIII: Hearing is normal.  CN IX, X: Palate elevates symmetrically. Normal gag reflex.  CN XI: Shoulder shrug strength is normal.  CN XII: Tongue midline without atrophy or fasciculations.    Motor  Normal muscle bulk throughout. No fasciculations present. No abnormal involuntary movements. Strength is 5/5 in all four extremities except as noted. No pronator drift.  R shoulder weakness due to shoulder pathology   Mild distal RUE and RLE weakness (post CVA)  .    Sensory  Light touch is normal in upper and lower extremities. Pinprick is normal in upper and lower extremities. Vibration is normal in upper and lower extremities.   Negative Tinnel's at both wrists .    Reflexes                                            Right                      Left  Brachioradialis                    2+                         2+  Biceps                                 2+                         2+  Patellar                                2+                         2+    Right pathological reflexes: Ankle clonus absent.  Left pathological reflexes: Ankle clonus  "absent.    Coordination  Right: Finger-to-nose normal.Left: Finger-to-nose normal.    Gait   Unable to rise from chair without using arms.  R arm in flexion, mild drag on RLE (post CVA).        DIAGNOSTIC DATA:  I have personally reviewed provider notes, labs and imaging made available to me today.     Imaging:  N/a    Cardiac:  N/a      Labs:  Lab Results   Component Value Date    WBC 6.61 08/08/2017    HGB 14.4 08/08/2017    HCT 43.2 08/08/2017     08/08/2017    MCV 90 08/08/2017    RDW 13.6 08/08/2017     Lab Results   Component Value Date     08/08/2017    K 3.9 08/08/2017     08/08/2017    CO2 26 08/08/2017    BUN 13 08/08/2017    CREATININE 0.90 08/08/2017     (H) 08/08/2017    CALCIUM 9.6 08/08/2017     Lab Results   Component Value Date    PROT 7.1 08/08/2017    ALBUMIN 4.2 08/08/2017    BILITOT 1.0 08/08/2017    AST 27 08/08/2017    ALKPHOS 61 08/08/2017    ALT 37 08/08/2017     No results found for: "INR", "PROTIME", "PTT"  Lab Results   Component Value Date    CHOL 129 08/08/2017    HDL 38 (L) 08/08/2017    LDLCALC 35.6 (L) 08/08/2017    TRIG 277 (H) 08/08/2017    CHOLHDL 29.5 08/08/2017     Lab Results   Component Value Date    HGBA1C 6.8 (H) 08/08/2017      Lab Results   Component Value Date    OISYWUUR79 380 06/13/2011     No results found for: "FOLATE"  Lab Results   Component Value Date    TSH 0.627 08/08/2017           ASSESSMENT & PLAN:  Cyrus Eckert is a 74 y.o. R-handed male seen in consultation for paresthesias.     Problem List Items Addressed This Visit          Neuro    Stroke    Overview     2012 - residual R HP, dysarthria          Current Assessment & Plan     Maintained on DAPT, [place] statin          Arm paresthesia, left - Primary    Current Assessment & Plan     Suspect for L CTS  Start HS brace  Obtain EMG of the LUE               Cardiac/Vascular    Bilateral claudication of lower limb    Current Assessment & Plan     On max med therapy for vascular RF  Check " OZZY / arterial US BLE   Sensations not c/w peripheral neuropathy and no e/o neuropathy on exam             Other    Hypersomnia    Current Assessment & Plan     Recommend HST to eval for MING  Pt defers today           Other Visit Diagnoses       Left carpal tunnel syndrome                Follow up:   in 6-8 weeks       I spent a total of 45 minutes on the day of the visit.    This includes face to face time with the patient, as well as non-face to face time preparing for and completing the visit (review of prior diagnostic testing and clinical notes, obtaining or reviewing history, documenting clinical information in the EMR, independently interpreting and communicating results to the patient/family and coordinating ongoing care).       I appreciate the opportunity to participate in the care of this patient. Please feel free to contact me with any concerns or questions.       June Cota, ACNPC-AG  [facility]  [address] [facility] [place] [address]

## 2025-02-06 ENCOUNTER — HOSPITAL ENCOUNTER (OUTPATIENT)
Dept: CARDIOLOGY | Facility: HOSPITAL | Age: 75
Discharge: HOME | End: 2025-02-06
Attending: NURSE PRACTITIONER
Payer: MEDICARE

## 2025-02-06 DIAGNOSIS — I73.9 BILATERAL CLAUDICATION OF LOWER LIMB: ICD-10-CM

## 2025-02-06 LAB
LEFT ANT TIBIAL SYS PSV: 73 CM/S
LEFT CFA PSV: 104 CM/S
LEFT PERONEAL SYS PSV: 38 CM/S
LEFT POPLITEAL PSV: 62 CM/S
LEFT POST TIBIAL SYS PSV: 78 CM/S
LEFT PROFUNDA SYS PSV: 149 CM/S
LEFT SUPER FEMORAL DIST SYS PSV: 75 CM/S
LEFT SUPER FEMORAL MID SYS PSV: 100 CM/S
LEFT SUPER FEMORAL OSTIAL SYS PSV: 115 CM/S
LEFT SUPER FEMORAL PROX SYS PSV: 104 CM/S
LEFT TIB/PER TRUNK SYS PSV: 89 CM/S
OHS CV LEFT LOWER EXTREMITY ABI (NO CALC): 1.15
OHS CV RIGHT ABI LOWER EXTREMITY (NO CALC): 1.18
RIGHT ANT TIBIAL SYS PSV: 78 CM/S
RIGHT CFA PSV: 100 CM/S
RIGHT PERONEAL SYS PSV: 66 CM/S
RIGHT POPLITEAL PSV: 64 CM/S
RIGHT POST TIBIAL SYS PSV: 71 CM/S
RIGHT PROFUNDA SYS PSV: 59 CM/S
RIGHT SUPER FEMORAL DIST SYS PSV: 44 CM/S
RIGHT SUPER FEMORAL MID SYS PSV: 90 CM/S
RIGHT SUPER FEMORAL OSTIAL SYS PSV: 84 CM/S
RIGHT SUPER FEMORAL PROX SYS PSV: 68 CM/S
RIGHT TIB/PER TRUNK SYS PSV: 113 CM/S

## 2025-02-06 PROCEDURE — 93925 LOWER EXTREMITY STUDY: CPT | Mod: PO

## 2025-02-06 PROCEDURE — 93925 LOWER EXTREMITY STUDY: CPT | Mod: 26,,, | Performed by: INTERNAL MEDICINE

## 2025-02-10 ENCOUNTER — TELEPHONE (OUTPATIENT)
Dept: NEUROLOGY | Facility: CLINIC | Age: 75
End: 2025-02-10
Payer: MEDICARE

## 2025-02-10 NOTE — TELEPHONE ENCOUNTER
----- Message from June Cota NP sent at 2/10/2025  9:00 AM CST -----  The scan of the blood vessels in your legs did not show anything worrisome. There is a mild degree of plaque, but this isn't causing any significant blockage in the vessel.     Best regards,  June Cota NP

## 2025-02-13 ENCOUNTER — TELEPHONE (OUTPATIENT)
Dept: NEUROLOGY | Facility: CLINIC | Age: 75
End: 2025-02-13
Payer: MEDICARE

## 2025-03-03 ENCOUNTER — PROCEDURE VISIT (OUTPATIENT)
Dept: NEUROLOGY | Facility: CLINIC | Age: 75
End: 2025-03-03
Payer: MEDICARE

## 2025-03-03 DIAGNOSIS — M54.12 CERVICAL RADICULOPATHY: ICD-10-CM

## 2025-03-03 DIAGNOSIS — R20.2 ARM PARESTHESIA, LEFT: ICD-10-CM

## 2025-03-03 DIAGNOSIS — G56.02 CARPAL TUNNEL SYNDROME OF LEFT WRIST: ICD-10-CM

## 2025-03-03 DIAGNOSIS — G60.9 IDIOPATHIC PERIPHERAL NEUROPATHY: Primary | ICD-10-CM

## 2025-03-03 PROCEDURE — 95909 NRV CNDJ TST 5-6 STUDIES: CPT | Mod: 26,S$PBB,, | Performed by: PSYCHIATRY & NEUROLOGY

## 2025-03-03 PROCEDURE — 95886 MUSC TEST DONE W/N TEST COMP: CPT | Mod: 26,S$PBB,, | Performed by: PSYCHIATRY & NEUROLOGY

## 2025-03-03 PROCEDURE — 95909 NRV CNDJ TST 5-6 STUDIES: CPT | Mod: PBBFAC,PO | Performed by: PSYCHIATRY & NEUROLOGY

## 2025-03-03 PROCEDURE — 95886 MUSC TEST DONE W/N TEST COMP: CPT | Mod: PBBFAC,PO | Performed by: PSYCHIATRY & NEUROLOGY

## 2025-03-10 NOTE — PROCEDURES
[facility]  [address]  [place] [address]  [phone]      Full Name: Cyrsu Eckert Gender: Male  Patient ID: [id number] YOB: 1950      Visit Date: 3/3/2025 1:54 PM  Age: 74 Years  Examining Physician: Dori Choi D.O., ABPN, AOBNP, ABEM   Referring Physician: June Cota [facility]   Technologist: ELKIN Duron   Height: 6 feet 0 inch  History: Diabetic patient complaining numbness of the 2nd and third digits on the left hand.  No neck pain.  He has a history of stroke and has fractured his left arm twice.  He has been referred for an EMG of the left upper extremity.        Sensory NCS      Nerve / Sites Rec. Site Onset Lat Peak Lat NP Amp Segments Distance Peak Diff Velocity Temp.     ms ms µV  cm ms m/s °C   L Median - Digit II (Antidromic)      Wrist Dig II NR NR NR Wrist - Dig II 13  NR 35.7      Ref.   <=3.80 >=10.0 Ref.   >=50    L Ulnar - Digit V (Antidromic)      Wrist Dig V NR NR NR Wrist - Dig V 11  NR 35.7      Ref.   <=3.20 >=10.0 Ref.   >=50    L Radial - Anatomical snuff box (Forearm)      Forearm Wrist 2.56 3.29 9.6 Forearm - Wrist 10  39 35.7      Ref.   <=2.80 >=10.0 Ref.           Motor NCS      Nerve / Sites Muscle Latency Ref. Amplitude Ref. Amp % Duration Segments Distance Lat Diff Ref. Velocity Ref. Temp.     ms ms mV mV % ms  cm ms ms m/s m/s °C   L Median - APB      Wrist APB 7.00 <=4.00 2.5 >=5.0 100 6.67 Wrist - APB      35.7      Elbow APB 12.81  2.5  102 8.42 Elbow - Wrist 24.5 5.81  42 >=50 35.7   L Ulnar - ADM      Wrist ADM 3.25 <=3.10 9.8 >=7.0 100 8.19 Wrist - ADM      35.7      B.Elbow ADM 8.02  9.6  98.3 8.63 B.Elbow - Wrist 23 4.77  48 >=50 35.7      A.Elbow ADM 9.58  9.3  94.4 8.85 A.Elbow - B.Elbow 10 1.56  64  35.7   L Median, Ulnar - Lumbrical-Interossei      Median Wrist Lumb II 6.73  0.4  100 6.98 Median Wrist - Lumb II 10     35.7      Ulnar Wrist Lumb II 3.85  4.1  1129 6.94  Ulnar Wrist - Lumb II 10     35.7           Median Wrist - Ulnar Wrist  2.88 <=0.50   35.7       F  Wave      Nerve Fmin Ref.    ms ms   L Median - APB 39.48 <=31.00   L Ulnar - ADM 32.71 <=32.00       EMG Summary Table     Spontaneous Recruitment Activation Duration Amplitude Polyphasia Comment   Muscle Ins Act Fib Fasc Pattern - - - - -   L. First dorsal interosseous Normal 0 0 Sl Dec Normal Normal Normal N/+1 Normal   L. Abductor pollicis brevis Inc +2 0 Mod Dec Normal +1 +1 +1 Normal   L. Pronator teres Normal 0 0 Sl Dec Normal N/+1 N/+1 N/+1 Normal   L. Extensor digitorum communis Normal 0 0 Normal Normal Normal Normal Normal Normal   L. Biceps brachii Normal 0 0 Sl Dec Normal N/+1 N/+1 N/+1 Normal   L. Triceps brachii Normal 0 0 Sl Dec Normal N/+1 N/+1 N/+1 Normal   L. Deltoid Normal 0 0 Sl Dec Normal N/+1 N/+1 N/+1 Normal   L. Cervical paraspinals Normal 0 0      Normal         Cyrus Eckert [id number] 3/3/2025 1:54 PM     4 of 4    Summary:  Nerve conduction studies were performed on the left upper extremity.  Left median sensory response was absent.  Left ulnar sensory response was absent.  Left radial sensory respnose was prolonged with a slightly reduced amplitude.  Left median motor response was markedly prolonged with a reduced amplitude and slowing of the velocity.  Left ulnar motor response was prolonged with a normal amplitude and bordeline slowing of the distal velocity.  Left median minimal F-wave latency was markedly prolonged.  Left ulnar minimal F wave latency was borderline prolonged.  Due to concerns for carpal tunnel syndrome further internal comparison studies were performed.  Left median versus ulnar 2nd lumbrical interosseous comparison studies revealed a prolonged median motor latency as compared to the ulnar.  Needle EMG was performed in the left ulnar extremity and paraspinal muscles.  Active denervation was present in the left abductor pollicis brevis muscle.  Motor units were large, long,  and polyphasic with reduced recruitment in the left abductor pollicis brevis, pronator teres, biceps, triceps and deltoid muscles.  Motor units were polyphasic with reduced recruitment in the left first dorsal interosseous muscle.  All other motor unit morphology and recruitment patterns were normal.       Impression: This is an abnormal EMG of the left upper extremity.  The findings are as follows:  Chronic, severe, left median mononeuropathy across the wrist (carpal tunnel syndrome) with active denervation.  Chronic, mild, axonal, peripheral polyneuropathy wthout active denervation.  Chronic, mild to moderate, left cervical radiculopathy at C6 without active denervation.  Additional evidence to suggest C7 radiculopathy as well.  Clinical correlation is advised.    There is no evidence of any other focal neuropathy, radiculopathy or plexopathy on this study.      Thank you for referring to the [facility] in [place]. Please feel free to contact the clinic if you have any further questions regarding this study or report.       _____________________________  Dori Choi D.O., ABPN, AOBNP, ANNETTA Eckert [id number] 3/3/2025 1:54 PM     4 of 4

## 2025-03-11 ENCOUNTER — TELEPHONE (OUTPATIENT)
Dept: NEUROLOGY | Facility: CLINIC | Age: 75
End: 2025-03-11
Payer: MEDICARE

## 2025-03-11 ENCOUNTER — RESULTS FOLLOW-UP (OUTPATIENT)
Dept: NEUROLOGY | Facility: CLINIC | Age: 75
End: 2025-03-11

## 2025-03-11 NOTE — TELEPHONE ENCOUNTER
----- Message from June Cota NP sent at 3/11/2025 12:29 PM CDT -----  Please sched f/u for result review, ok for VV if he prefers  ----- Message -----  From: Dori Choi DO  Sent: 3/9/2025  10:59 PM CDT  To: June Cota NP

## 2025-03-27 ENCOUNTER — OFFICE VISIT (OUTPATIENT)
Dept: NEUROLOGY | Facility: CLINIC | Age: 75
End: 2025-03-27
Payer: MEDICARE

## 2025-03-27 DIAGNOSIS — I63.9 CEREBROVASCULAR ACCIDENT (CVA), UNSPECIFIED MECHANISM: ICD-10-CM

## 2025-03-27 DIAGNOSIS — I69.351 HEMIPLEGIA AND HEMIPARESIS FOLLOWING CEREBRAL INFARCTION AFFECTING RIGHT DOMINANT SIDE: ICD-10-CM

## 2025-03-27 DIAGNOSIS — M54.12 CERVICAL RADICULOPATHY: ICD-10-CM

## 2025-03-27 DIAGNOSIS — G56.02 LEFT CARPAL TUNNEL SYNDROME: Primary | ICD-10-CM

## 2025-03-27 DIAGNOSIS — G62.9 PERIPHERAL POLYNEUROPATHY: ICD-10-CM

## 2025-03-27 PROBLEM — R20.2 ARM PARESTHESIA, LEFT: Status: RESOLVED | Noted: 2025-02-03 | Resolved: 2025-03-27

## 2025-03-27 NOTE — ASSESSMENT & PLAN NOTE
Reviewed diagnosis based on clinical symptoms and EMG data confirmed chronic severe L CTS + active denervation   Recommend referral to orthopedics to discuss intervention given that the L is now his dominant post CVA affecting the R -- he declines at present  Encouraged him to be more consistent with HS bracing   Denies need for neuropathic pain Rx at this point

## 2025-03-27 NOTE — PROGRESS NOTES
NEUROLOGY  Outpatient Visit     [facility]  [address] [facility], [place] [address]  [phone] (office) / [phone] (fax)    Patient Name:  Cyrus Eckert  :  1950  MR #:  [id number]  Acct #:  [id number]    Date of  Visit: 2025    Other Physicians:  Christina Gar MD (Primary Care Physician)      CHIEF COMPLAINT: No chief complaint on file.    The patient location is: [place]  The chief complaint leading to consultation is: LUE tingling    Visit type: audiovisual    Face to Face time with patient: 13 min    Each patient to whom he or she provides medical services by telemedicine is:  (1) informed of the relationship between the physician and patient and the respective role of any other health care provider with respect to management of the patient; and (2) notified that he or she may decline to receive medical services by telemedicine and may withdraw from such care at any time.    Interval history:  3/27/25:  Cyrus Eckert is a 74 y.o. R-handed male seen in f/u for paresthesias     Medical history is significant for CVA in 2012, CAD w/stents, DM2, HTN, HLD, aortic stenosis    Leg pains have resolved with increase in walking. Normal OZZY on LE US.     Same L hand symptoms. EMG LUE showed severe L CTS with active denervation, as well as mild to mod chronic cervical radics but no denervation there. There was also e/o chronic axonal neuropathy.     Not waking up as much with pain. Mostly positional symptoms that are relieved by resting the arms dependent. No notable L hand weakness. He has only used the wrist brace a few times. Its hard to get it on but his wife could help him. He isn't interested in referral to ortho at this time.     Again, denies any neck pain or traditional radicular pain in the arms.     Denies neuropathy sx in his LE. Sensory exam normal during last visit. He is diabetic. He will see his PCP soon for annual visit with labs.     Continues on same CVA prevention  "regimen. No other changes to MH / meds.     HISTORY OF PRESENT ILLNESS:  Cyrus Eckert is a 74 y.o. R-handed male seen in consultation for paresthesias per No ref. provider found    Medical history is significant for CVA in 2012, CAD w/stents, DM2, HTN, HLD, aortic stenosis    Tinging and numbness in L median distributions. Wakes him from sleep, painful. Shaking his hand doesn't help. No worsening of dexterity of weakness. Has tried Tylenol with minimal relief.     Denies neck pain, radicular pains.     Similar sensation in bilateral posterior calves, but more like a squeezing or tightness. None in feet. Bothers him when kneeling to standing during mass and also while he walks. Helps to elevate the legs.     No change in gait since stroke. Chronic R sciatica, unchanged.     Onset of both sensations ~ 6 months ago. Has not had any evaluation yet.     CVA in 2012 with residual dysarthria and RSW. Drags the R foot since. Not sure of cause, saw [facility] neurology in the past. Maintained on DAPT, statin.     Established with [facility]. No known PVD per pt. Hasn't mentioned these symptoms.     Recent ED visit to  with possible syncope. Was in [place] at Dr. Barros's office and "passed out" vs fell asleep per wife. She had trouble waking him up. He often falls asleep during the day. Never sleep tested. Wakes often from sleep. Snores.     Allergies:  Review of patient's allergies indicates:   Allergen Reactions    No known drug allergies        Current Medications:  Current Outpatient Medications   Medication Sig Dispense Refill    aspirin (ECOTRIN) 81 MG EC tablet Take 81 mg by mouth once daily.      atorvastatin (LIPITOR) 40 MG tablet Take 1 tablet (40 mg total) by mouth once daily. 30 tablet 5    clopidogrel (PLAVIX) 75 mg tablet Take 75 mg by mouth once daily.      glimepiride (AMARYL) 2 MG tablet Take 2 mg by mouth before breakfast.      KRILL OIL ORAL Take by mouth.      metFORMIN (GLUCOPHAGE) 1000 MG tablet Take 1,000 mg by " mouth 2 (two) times daily.      metoprolol tartrate (LOPRESSOR) 25 MG tablet Take 50 mg by mouth once daily.      NIFEdipine (ADALAT CC) 60 MG TbSR Take 30 mg by mouth once daily.      omega-3 fatty acids/fish oil (FISH OIL-OMEGA-3 FATTY ACIDS) 300-1,000 mg capsule Take by mouth once daily.      vitamin D (VITAMIN D3) 1000 units Tab Take 2,000 Units by mouth.       No current facility-administered medications for this visit.       Past Medical History:  Past Medical History:   Diagnosis Date    Amblyopia     OD    CAD (coronary artery disease)     Cataract     OD    Coronary stent     multivessel PCI    CVD (cerebrovascular disease) 03/2012    stroke    Diabetes mellitus     Hypertension     Myocardial infarct     Stroke     right sided weakness residual       Past Surgical History:  Past Surgical History:   Procedure Laterality Date    CATARACT EXTRACTION W/  INTRAOCULAR LENS IMPLANT Left 03/08/2017    Dr Lui    CATARACT EXTRACTION W/  INTRAOCULAR LENS IMPLANT Right 9/6/2023    Procedure: EXTRACTION, CATARACT, WITH IOL INSERTION;  Surgeon: Yumiko Gorman MD;  Location: [facility];  Service: Ophthalmology;  Laterality: Right;    CORONARY ANGIOPLASTY WITH STENT PLACEMENT         Family History:  family history is not on file.    Social History:   reports that he has never smoked. He has never used smokeless tobacco. He reports current alcohol use. He reports that he does not use drugs.      REVIEW OF SYSTEMS:  As per HPI    PHYSICAL EXAM:  There were no vitals taken for this visit.    General: Well groomed. No acute distress.  Pulmonary: Normal effort and rate.     Neurological Exam  Mental Status  Awake and alert. Oriented to person, place, time and situation. Mild dysarthria present. Expressive aphasia present. Mild. Fund of knowledge is appropriate for level of education.    Cranial Nerves  CN III, IV, VI: Normal lids and orbits bilaterally.  CN V: Facial sensation is normal.  CN VII:  Right: There is  "central facial weakness. Mild.  CN XII: Tongue midline without atrophy or fasciculations.    Motor   No abnormal involuntary movements. Strength is 5/5 in all four extremities except as noted. No pronator drift.  R shoulder weakness due to shoulder pathology   Mild distal RUE and RLE weakness (post CVA) noted on recent IP visit   .    Sensory  Light touch is normal in upper and lower extremities.     Reflexes    Right pathological reflexes: Ankle clonus absent.  Left pathological reflexes: Ankle clonus absent.    Coordination    No visible tremor.    Gait    Deferred .        DIAGNOSTIC DATA:  I have personally reviewed provider notes, labs and imaging made available to me today.     Imaging:  N/a    EMG LUE 3/2025: SCOTT  Impression: This is an abnormal EMG of the left upper extremity.  The findings are as follows:  Chronic, severe, left median mononeuropathy across the wrist (carpal tunnel syndrome) with active denervation.  Chronic, mild, axonal, peripheral polyneuropathy wthout active denervation.  Chronic, mild to moderate, left cervical radiculopathy at C6 without active denervation.  Additional evidence to suggest C7 radiculopathy as well.  Clinical correlation is advised.    There is no evidence of any other focal neuropathy, radiculopathy or plexopathy on this study.    Cardiac:  N/a      Labs:  Lab Results   Component Value Date    WBC 6.61 08/08/2017    HGB 14.4 08/08/2017    HCT 43.2 08/08/2017     08/08/2017    MCV 90 08/08/2017    RDW 13.6 08/08/2017     Lab Results   Component Value Date     12/27/2024    K 4.1 12/27/2024     08/08/2017    CO2 29 12/27/2024    BUN 18.0 12/27/2024    CREATININE 1.07 (H) 12/27/2024     (H) 08/08/2017    CALCIUM 8.6 12/27/2024     Lab Results   Component Value Date    PROT 7.1 08/08/2017    ALBUMIN 3.6 12/27/2024    BILITOT 0.8 12/27/2024    AST 12 (L) 12/27/2024    ALKPHOS 61 08/08/2017    ALT 16 12/27/2024     No results found for: "INR", " ""PROTIME", "PTT"  Lab Results   Component Value Date    CHOL 129 08/08/2017    HDL 38 (L) 08/08/2017    LDLCALC 35.6 (L) 08/08/2017    TRIG 277 (H) 08/08/2017    CHOLHDL 29.5 08/08/2017     Lab Results   Component Value Date    HGBA1C 6.8 (H) 08/08/2017      Lab Results   Component Value Date    IPKXTBLJ13 380 06/13/2011     No results found for: "FOLATE"  Lab Results   Component Value Date    TSH 0.627 08/08/2017           ASSESSMENT & PLAN:  Cyrus Eckert is a 74 y.o. R-handed male seen in f/u for paresthesias.     Problem List Items Addressed This Visit          Neuro    Stroke    Overview   2012 - residual R HP, dysarthria          Current Assessment & Plan   DAPT, statin for LDL < 70           Left carpal tunnel syndrome - Primary    Current Assessment & Plan   Reviewed diagnosis based on clinical symptoms and EMG data confirmed chronic severe L CTS + active denervation   Recommend referral to orthopedics to discuss intervention given that the L is now his dominant post CVA affecting the R -- he declines at present  Encouraged him to be more consistent with HS bracing   Denies need for neuropathic pain Rx at this point            Cervical radiculopathy    Peripheral polyneuropathy    Current Assessment & Plan   Pt denies symptoms, intact sensorimotor exam in clinic visit   Noted on EMG  Likely diabetic in nature          Hemiplegia and hemiparesis following cerebral infarction affecting right dominant side         Follow up:   PRN       Greater than 50% of the patient's >25 minute clinic visit was spent on counseling and arranging care.  Topics covered included diagnosis, prognosis, testing, and treatment.         I appreciate the opportunity to participate in the care of this patient. Please feel free to contact me with any concerns or questions.       June Cota, ACNPC-AG  [facility]  [address] [place] [phone]      "

## 2025-03-27 NOTE — ASSESSMENT & PLAN NOTE
Pt denies symptoms, intact sensorimotor exam in clinic visit   Noted on EMG  Likely diabetic in nature

## (undated) DEVICE — SLEEVE ULTRA INFUSION

## (undated) DEVICE — SYR LUER LOCK 1CC

## (undated) DEVICE — SOL BETADINE 5%

## (undated) DEVICE — SEE MEDLINE ITEM 157128

## (undated) DEVICE — PACK EYE CUSTOM COVINGTON.

## (undated) DEVICE — SEE L#120831

## (undated) DEVICE — GLOVE BIOGEL ECLIPSE SZ 6.5

## (undated) DEVICE — SPONGE WEC CEL SPEARS

## (undated) DEVICE — DRAPE OPHTHALMIC 48X62 FEN

## (undated) DEVICE — SOL IRR BSS OPHTH 500ML STRL

## (undated) DEVICE — Device

## (undated) DEVICE — CARTRIDGE SHOOTER

## (undated) DEVICE — DRESSING TRANS 2X2 TEGADERM

## (undated) DEVICE — GLASSES EYE PROTECTIVE

## (undated) DEVICE — GLOVE BIOGEL ECLIPSE SZ 7

## (undated) DEVICE — PACK OPHTHALMIC

## (undated) DEVICE — CANNULA ANTERIOR CHAMBER 30G

## (undated) DEVICE — SOL IRR STRL WATER 500ML